# Patient Record
Sex: FEMALE | Race: WHITE
[De-identification: names, ages, dates, MRNs, and addresses within clinical notes are randomized per-mention and may not be internally consistent; named-entity substitution may affect disease eponyms.]

---

## 2017-05-19 NOTE — EDM.PDOC
ED HPI GENERAL MEDICAL PROBLEM





- General


Stated Complaint: LOW POTASSIUM


Time Seen by Provider: 05/19/17 16:09


Source of Information: Reports: Patient, Family


History Limitations: Reports: Physical Impairment





- History of Present Illness


INITIAL COMMENTS - FREE TEXT/NARRATIVE: 





71 y.o.w.f. with a h/o frequent falls, came to the ED after she was seen at the 

clinic for her yearly physical, when she was found her potassium was 2.3. Pt is 

a poor historian, her HPT was given by her daughter. Pt complains of weakness 

and being always tired. Pt take thyroid meds. Pt denies any other acute medical 

issue.


Onset: Gradual


Onset Date: 05/16/17


Onset Time: 17:00


Duration: Day(s):


Location: Reports: Generalized


Severity: Mild


Improves with: Reports: None


Worsens with: Reports: None


Associated Symptoms: Reports: No Other Symptoms





- Related Data


 Allergies











Allergy/AdvReac Type Severity Reaction Status Date / Time


 


amoxicillin Allergy  Rash Verified 05/19/17 16:35


 


latex Allergy  Cough Verified 05/19/17 16:36











Home Meds: 


 Home Meds





Aspirin 81 mg PO DAILY 05/19/17 [History]


Atenolol/Chlorthalidone [Atenolol-Chlorthalidone 100-25] 1 tab PO DAILY 05/19/ 17 [History]


Calcium Carb/Vit D3/Minerals [Calcium 600+D Plus Minerals] 1,200 mg PO DAILY 05/ 19/17 [History]


Cholecalciferol (Vitamin D3) [Vitamin D3] 1,000 unit PO DAILY 05/19/17 [History]


DULoxetine [Cymbalta] 20 mg PO DAILY 05/19/17 [History]


Dextran 70/Hypromellose/PF [Artificial Tears Drops] 1 each OP BID 05/19/17 [

History]


Docusate Calcium 240 mg PO DAILY 05/19/17 [History]


Furosemide [Lasix] 20 mg PO DAILY 05/19/17 [History]


Multivitamin [Multi-Vitamin Daily] 1 tab PO DAILY 05/19/17 [History]


Omega-3S/DHA/Epa/Fish Oil [Omega-3 Fish Oil 1,200 mg Sfgl] 1,200 mg PO DAILY 05/ 19/17 [History]


Tolterodine Tartrate [Detrol LA] 4 mg PO DAILY 05/19/17 [History]


cloNIDine [Catapres] 0.1 mg PO DAILY 05/19/17 [History]











ED ROS GENERAL





- Review of Systems


Review Of Systems: Unable To Obtain





ED EXAM, GENERAL





- Physical Exam


Exam: See Below


Exam Limited By: Physical Impairment


General Appearance: Alert, Mild Distress, Obese (morbid)


Eye Exam: Bilateral Eye: Normal Inspection


Ears: Normal External Exam


Ear Exam: Bilateral Ear: Auricle Normal


Nose: Normal Inspection, Normal Mucosa


Throat/Mouth: Normal Inspection, Normal Lips


Head: Atraumatic, Normocephalic


Neck: Tender Midline


Respiratory/Chest: No Respiratory Distress, Lungs Clear (poor insp effort)


Cardiovascular: Normal Peripheral Pulses, Regular Rate, Rhythm


Peripheral Pulses: 2+: Femoral (L), Femoral (R)


GI/Abdominal: Normal Bowel Sounds, Soft, Non-Tender


 (Female) Exam: Deferred


Rectal (Female) Exam: Deferred


Back Exam: Normal Inspection, Full Range of Motion


Extremities: Normal Inspection, Normal Range of Motion


Neurological: Alert, Oriented, CN II-XII Intact


Psychiatric: Depressed Mood


Skin Exam: Warm, Intact, Normal Color


Lymphatic: No Adenopathy





EKG INTERPRETATION


EKG Date: 05/19/17


Time: 18:10


Rhythm: NSR


Rate (beats/min): 61


Axis: normal


P-wave: present


QRS: normal


ST-T: normal


QT: normal


Comparison: NA - no prior EKG





Course





- Vital Signs


Text/Narrative:: 





71 y.o.w.f. with a h/o frequent falls, came to the ED after she was seen at the 

clinic for her yearly physical, when she was found her potassium was 2.3. Pt is 

a poor historian, her HPT was given by her daughter. Pt complains of weakness 

and being always tired. Pt take thyroid meds. Has neck painPt denies any other 

acute medical issue.


PE: Morbid obese 71 y.o.w.f in NAD, Neck pain to palpation at midline


labs: Potassium 2.3  Mg pending UA pos fro uti


Impression: Hypokalemia, Morbid obese, frequent falls, weakness, neck pain, UTI,


Tx: KCL 40meq po, 20 meq i v over 2 hours.  


Reexam: Improved


Plan: Admit to med/surge tele for obs


Consultation: Dr. Murphy: Admit to Dr. Lozano 


Last Recorded V/S: 


 Last Vital Signs











Temp  36.4 C   05/19/17 16:10


 


Pulse  64   05/19/17 16:10


 


Resp  18   05/19/17 16:10


 


BP  133/56 L  05/19/17 16:10


 


Pulse Ox  98   05/19/17 16:10














- Orders/Labs/Meds


Orders: 


 Active Orders 24 hr











 Category Date Time Status


 


 EKG Documentation Completion [RC] ASDIRECTED Care  05/19/17 16:31 Active


 


 CXR [Chest 2V] [CR] Stat Exams  05/19/17 17:06 Taken


 


 Cervical Spine 1V [CR] Stat Exams  05/19/17 16:44 Stop Req


 


 Cervical Spine wo Cont [CT] Stat Exams  05/19/17 16:47 Taken


 


 EKG 12 Lead [EK] Routine Ther  05/19/17 16:30 Ordered








 Medication Orders





Enoxaparin Sodium (Lovenox)  40 mg SUBCUT Q12HR SHANI


Sodium Chloride (Normal Saline)  250 mls @ 100 mls/hr IV ASDIRECTED SHANI


   Last Admin: 05/19/17 18:38  Dose: 100 mls/hr








Labs: 


 Laboratory Tests











  05/19/17 Range/Units





  17:09 


 


Urine Color  Yellow  (YELLOW)  


 


Urine Appearance  Clear  (CLEAR)  


 


Urine pH  7.0 H  (5.0-6.5)  


 


Ur Specific Gravity  1.010  (1.010-1.025)  


 


Urine Protein  Negative  (NEGATIVE)  mg/dL


 


Urine Glucose (UA)  Normal  (NEGATIVE)  mg/dL


 


Urine Ketones  Negative  (NEGATIVE)  mg/dL


 


Urine Occult Blood  Moderate H  (NEGATIVE)  


 


Urine Nitrite  Negative  (NEGATIVE)  


 


Urine Bilirubin  Negative  (NEGATIVE)  


 


Urine Urobilinogen  Normal  (NEGATIVE)  mg/dL


 


Ur Leukocyte Esterase  Moderate H  (NEGATIVE)  


 


Urine RBC  5-10  (0)  


 


Urine WBC  10-20 H  (0)  


 


Ur Squamous Epith Cells  Few H  (NS,R,O)  


 


Urine Bacteria  Few H  (NS)  











Meds: 


Medications











Generic Name Dose Route Start Last Admin





  Trade Name Freq  PRN Reason Stop Dose Admin


 


Enoxaparin Sodium  40 mg  05/19/17 17:30  





  Lovenox  SUBCUT   





  Q12HR SHANI   


 


Sodium Chloride  250 mls @ 100 mls/hr  05/19/17 18:30  05/19/17 18:38





  Normal Saline  IV   100 mls/hr





  ASDIRECTED SHANI   Administration














Discontinued Medications














Generic Name Dose Route Start Last Admin





  Trade Name Freq  PRN Reason Stop Dose Admin


 


Ciprofloxacin  500 mg  05/19/17 17:47  05/19/17 18:39





  Ciprofloxacin Hcl  PO  05/19/17 17:48  500 mg





  ONETIME ONE   Administration


 


Potassium Chloride 20 meq/  100 mls @ 50 mls/hr  05/19/17 16:28  05/19/17 18:16





  Premix  IV  05/19/17 18:27  50 mls/hr





  ONETIME ONE   Administration


 


Potassium Chloride  40 meq  05/19/17 16:22  05/19/17 16:32





  Klor-Con M20  PO  05/19/17 16:23  40 meq





  ONETIME ONE   Administration














Departure





- Departure


Time of Disposition: 18:35


Disposition: Refer to Observation


Condition: fair


Clinical Impression: 


 Hypokalemia








- My Orders


Last 24 Hours: 


My Active Orders





05/19/17 16:30


EKG 12 Lead [EK] Routine 





05/19/17 16:31


EKG Documentation Completion [RC] ASDIRECTED 





05/19/17 16:44


Cervical Spine 1V [CR] Stat 





05/19/17 16:47


Cervical Spine wo Cont [CT] Stat 





05/19/17 17:06


CXR [Chest 2V] [CR] Stat 














- Assessment/Plan


Last 24 Hours: 


My Active Orders





05/19/17 16:30


EKG 12 Lead [EK] Routine 





05/19/17 16:31


EKG Documentation Completion [RC] ASDIRECTED 





05/19/17 16:44


Cervical Spine 1V [CR] Stat 





05/19/17 16:47


Cervical Spine wo Cont [CT] Stat 





05/19/17 17:06


CXR [Chest 2V] [CR] Stat

## 2017-05-20 NOTE — HP
ADMISSION DATE:  2017

 

REASON FOR VISIT:  Recent fall, weakness, headache, profound hypokalemia.

 

HISTORY OF PRESENT ILLNESS:  Bryanna Abarca was seen in the morning of

2017.

 

Had been admitted on  through the ER.  Presented with a recent fall neck

injury, profound hypokalemia, generalized weakness.

 

She was found to have a potassium of 2.3 on the day of admission.  Admission to

hospital is indicated.

 

MEDICATIONS:  Present daily medications include:

1. ASA aspirin 81 mg 1 p.o. daily.

2. Betamethasone b.i.d. rash.

3. Calcium plus vitamin D2 daily.

4. Vitamin D3 1000 units one daily.

5. Furosemide 20 mg 1 p.o. daily.

6. Multivitamin one daily.

7. Nutrition omega-3 fish oil one daily.

8. Nutrition atenolol HCTZ 10 to 100-25 one p.o. daily blood pressure.

9. Citalopram 40 mg 1 p.o. daily mood stabilizer.

10.Cymbalta 30 mg 1 p.o. b.i.d. pain control.

11.Docusate sodium 240 one daily stools.

12.Levothyroxine 88 mcg one p.o. daily hypothyroidism.

13.Spironolactone 25 mg 1 p.o. daily edema.

14.Clonidine patch 0.1 mg weekly.

15.Trazodone 100 mg 1 p.o. at bedtime.

 

ALLERGIES:  Allergic to amoxicillin with rash, latex with cough.

 

PAST MEDICAL HISTORY:  Significant for bilateral total knee arthroplasty,

hysterectomy with bilateral salpingo-oophorectomy done through the abdominal

wall. She has an ongoing leg ulcer intervention per Dr. Izaguirre.  No other

operative procedures, hospitalizations, unusual childhood diseases, major

injuries, or fractures.

 

GYNECOLOGICAL HISTORY:  Postmenopausal,  1, para 1 female.

 

SOCIAL HISTORY:  .   .  He was a baker, she worked at [employer]

[employer].  One daughter whom she lives.  Nonsmoker.  No alcohol

consumption.  No illicit drug use.

 

REVIEW OF SYSTEMS:  History difficult.  Feeling weak and feeling tired,

intermittent neck pain.  Denies respiratory difficulty.  No bowel or bladder

impairment.  Some constipation, mild stress incontinence, left lower leg ulcer.

 

PHYSICAL EXAMINATION:  VITAL SIGNS:  36 degrees, pulse 70, blood pressure

129/50, mean blood pressure 76, respirations 18, 97% room air.  GENERAL:

Elderly, cooperative and conversant, significantly obese.  HEENT:  Funduscopic

benign.  Conjunctivae clear.  Bright tympanic membranes.  Decreased hearing.

Clear nasal discharge.  Mouth and oropharynx clear.  Poor dentition.  NECK:

Benign.  Thyroid small.  CHEST:  Clear in all lung fields.  No adventitious

sounds.  HEART:  Regular without ectopy or murmur.  ABDOMEN:  Soft, benign.  No

hepatosplenomegaly.  Well-healed lower abdominal surgical scar.  :  Deferred.

RECTAL:  Deferred.  EXTREMITIES:  Wrapped ulcer, left lower extremity below the

knee.  Peripheral pulses are diminished.  SKIN:  Eczematoid changes.

 

LABORATORY STUDIES:  This morning, , sodium 136, potassium 2.5, chloride 97.

GFR 55.  Urine suggesting UTI, on Cipro therapy.

 

ASSESSMENT:

1. Weakness due to hypokalemia.

2. Undiagnosed urinary tract infection.

3. Chronic pain syndrome.

4. Left lower extremity, leg ulcer.

5. Status post hysterectomy with bilateral oophorectomy.

6. Status post total knee arthroplasties.

7. Left leg ulcer.

 

PLAN:  Medications, care and treatment appropriate.  Replacement of potassium,

fluids, and hydration.  Proceed accordingly.  Urinary tract infection will be

reviewed.

 

CT cervical spine showed no bony injury.

 

Job#: [id number]

DD: 2017 0946

DT: 2017 1808 MULU/TULIO

## 2017-05-22 NOTE — PN
DATE SEEN:  05/21/2017

 

SUBJECTIVE:  Jenna Abarca is a 71-year-old  female, admitted with

profound weakness, jitteriness, increasing lethargy, and quite significant

hypokalemia, 2.5 on admission, 2.9, and 2.7 today.

 

In today for review.

 

She has had intravenous potassium.  Spoke to opportunities and implications.

 

Home situation may be under some conflict.

 

Spoke to opportunities in care, and home living situation along with support

services.   will be consulted.

 

MEDICATIONS:  Present medications include oral ciprofloxacin for UTI, Lovenox

for DVT prevention, and intravenous potassium.

 

OBJECTIVE:  VITAL SIGNS:  Temperature 36.6, pulse 67, blood pressure 105/70, 18

respirations, O2 saturations 98%.  NECK:  Benign, thyroid small.  CHEST:  Clear

in all lung fields.  No adventitious sounds.  HEART:  Regular without ectopy or

murmur.  ABDOMEN:  Obese.  EXTREMITIES:  Edema lower extremity.  Ulcerative

lesion covered left lower extremity.

 

ASSESSMENT:  Hypokalemia with weakness, marked obesity, progress decline in

physical well being.

 

PLAN:  PT, OT,  involved complementary care and well being.

Proceed accordingly.

 

Job#: [id number]

DD: 05/21/2017 1013

DT: 05/21/2017 1208 MULU/TULIO

## 2017-05-22 NOTE — CR
INDICATION:  Weakness. 



CHEST:  PA and lateral views of the chest 05/19/2017 were compared with 03/01/
2017 and 07/25/2006, revealing evidence of exogenous obesity as previously.  



The heart may be slightly increased in size, but remains within normal limits 
in size.  The aorta is tortuous.  Findings suggest ASHD of mild degree.  There 
is suggestion of a mass behind the heart which could be on the basis of a fixed 
hiatal hernia of moderate size.  



A definite active infiltrate or effusion was not identified.  



Slight flattening of diaphragm leaves and prominent AP diameter raise question 
of a mild degree of COPD - correlate clinically. 



Bridging hyperostotic changes are noted in the mid to lower thoracic spine with 
evidence of disk disease at several mid thoracic levels.  



IMPRESSION:  

1.  No acute process. 

2.  Probable mild ASHD.

3.  Probable mild COPD. 

4.  DJD spine with a mild dextroconvex scoliosis.

5.  Suggestion of a mass behind the heart which could be on the basis of a 
moderate sized fixed hiatal hernia.  This could be confirmed by esophagram or 
CT of the abdomen/chest as necessary clinically.  
MTDD

## 2017-05-22 NOTE — PN
DATE SEEN:  05/22/2017

 

SUBJECTIVE:  Jenna Abarca is a 71-year-old  female, admitted with

complicated weakness, profound hypokalemia, and weakness.  Clinical response has

been a bit subdued.  Potassium went from 2.5 to 2.9 to 2.7 to 3.0 this morning.

 

This in spite of multiple doses of IV potassium.

 

Care situation is in question.  She lives with her daughter and daughter's too

semi dependent children.  Living situation in under consideration.

 

PT OT involved.

 

Laboratory studies, otherwise have been unremarkable.

 

EXAM:  VITAL SIGNS:  36.4; pulse of 62; 102/56 is the blood pressure, mean blood

pressure 71; respirations 18; 92% on room air.  GENERAL:  Sitting in the upright

position.  Speech was fluent.  NECK:  Benign.  Thyroid small.  CHEST:  Clear in

all lung fields.  Decreased breath sounds both bases.  HEART:  Sounds were

distant.  ABDOMEN:  Markedly obese

 

dressing to the left lower leg under supervision by Dr. Izaguirre.  Moderate edema.

 

ASSESSMENT:

1. Hypokalemia with weakness.

2. Living situation in question, independence in question.

 

PLAN:  Upcoming review with , PT OT, and Intervention.

We will switch from IV to oral potassium, discharge plan accordingly.

 

Job#: [id number]

DD: 05/22/2017 0844

DT: 05/22/2017 1345 MULU/TULIO

## 2017-05-23 NOTE — PN
DATE SEEN:  05/22/2017

 

SUBJECTIVE:  This patient is being seen for a dressing change.  She was

scheduled to see me today.  She is being followed for venous stasis disease in

the medial aspect of her left ankle.  She had developed a small punctate

ulceration and was scheduled to see me today in clinic; however, due to her

admission for hypokalemia I was asked to see the patient here in the hospital.

 

OBJECTIVE:  The Unna boot which had been applied last week was removed.  The

lesion itself demonstrates a small breakdown of the skin approximately 1 cm in

diameter consisting of several punctate lesions.  She also has some venous

stasis changes to her lower extremity.  Aquacel Ag was reapplied as was the Unna

Boot, Kerlix, and Ace wrap.  I have instructed the patient to follow up next

week after discharge for dressing change.

 

Job#: [id number]

DD: 05/22/2017 1612

DT: 05/22/2017 2121 AS/CASSIDYL

## 2017-05-23 NOTE — PN
DATE SEEN:  05/23/2017

 

SUBJECTIVE:  Jenna Abarca is a 71-year-old  female, admitted with

profound weakness and hypokalemia.

 

Response has been satisfactory.

 

Last two potassium is 3.6 and 3.4.

 

Ambulatory skills are limited, getting up from the bed, getting up from the

chair quite problematic.

 

Ambulatory skills are limited.

 

Focusing on improvement is under review.

 

Swing bed status upcoming and planned.

 

OBJECTIVE:  VITAL SIGNS:  36.1, 121/57, 70 is the mean blood pressure, 20 is the

respiration, 97% on room air.  GENERAL:  Appears comfortable.  Speech was

fluent.  NECK:  Benign.  Thyroid small.  CHEST:  Clear in all lung fields.

HEART:  Regular.  No ectopy or murmur.  ABDOMEN:  Benign.  Significantly obese.

EXTREMITIES:  Moderate edema lower extremities, ulcer left ankle, under review

and observation.

 

ASSESSMENT:  Complicated fatigue, profound weakness, hypokalemia.

 

PLAN:  Appears to be well, we will continue oral.  Potassium, complementary

care, and well being, medications as appropriate.

 

Swing bed planned, upcoming tomorrow.

 

Job#: [id number]

DD: 05/23/2017 1002

DT: 05/23/2017 1052 /TULIO

## 2017-05-25 NOTE — PN
DATE SEEN:  05/25/2017

 

Jenna Abarca is a 71-year-old  female, admitted with weakness,

hyponatremia, and deconditioning.

 

Hospital course was satisfactory with improvement of her hypokalemia.  It is all

under supervision.

 

Now in swing bed.

 

Ambulatory skills particularly going from sitting out of the chair and from bed

to ambulance have been a primary issue, PT and OT actively involved.

 

We will recheck a potassium today, complementary care and well being.  She is on

adequate potassium chloride 20 mEq t.i.d., she has not been symptomatic,

electrocardiographic issues have been stable.  Comfortable in that regard.

 

Job#: [id number]

DD: 05/25/2017 0843

DT: 05/25/2017 0929 MULU/TULIO

## 2017-05-25 NOTE — DISCH
DISCHARGE DATE:  05/24/2017

 

HOSPITAL COURSE:  Jenna Abarca is a 71-year-old   female

admitted with complicated fatigue, profound hypokalemia, and profound lethargy.

 

Metabolically, things improved.  Potassium levels returned to normal.  There was

an increasing issue of self-care issues, ability to sit, transfer, and move.

 

PT, OT were actively involved.

 

At the time of discharge, need for ongoing care, i.e., swing bed timely and

appropriate felt to be indicated.

 

DISCHARGE MEDICATIONS:  Please see med recon list.

 

SURGICAL PROCEDURES:  None.

 

CONSULTATIONS:  None.

 

Job#: [id number]

DD: 05/24/2017 0953

DT: 05/25/2017 0214 MULU/TULIO

## 2017-05-28 NOTE — PCM.PN
- General Info


Date of Service: 05/28/17


Admission Dx/Problem (Free Text): 


Patient states she's doing well.  She states she still feels weak but is 

getting stronger.  She denies chest pain, shortness of breath, or leg swelling.





- Patient Data


Vitals - most recent: 


 Last Vital Signs











Temp  97.7 F   05/28/17 08:00


 


Pulse  68   05/28/17 08:38


 


Resp  16   05/28/17 08:00


 


BP  120/71   05/28/17 08:38


 


Pulse Ox  96   05/28/17 08:00











Weight - most recent: 308 lb 11.2 oz


Med Orders - Current: 


 Current Medications





Acetaminophen (Tylenol Extra Strength)  1,000 mg PO Q6H PRN


   PRN Reason: Pain


   Last Admin: 05/27/17 23:55 Dose:  1,000 mg


Artificial Tears (Refresh Tears 0.5%)  0 ml EYEBOTH ASDIRECTED [facility]


   Last Admin: 05/27/17 13:25 Dose:  2 drop


Aspirin (Aspirin)  81 mg PO DAILY [facility]


   Last Admin: 05/28/17 08:34 Dose:  81 mg


Atenolol (Tenormin)  100 mg PO DAILY [facility]


   Last Admin: 05/28/17 08:38 Dose:  100 mg


Betamethasone Dipropionate (Diprosone 0.05% Crm)  0 gm TOP BID [facility]


   Last Admin: 05/28/17 08:36 Dose:  1 applic


Chlorthalidone (Chlorthalidone)  25 mg PO DAILY [facility]


   Last Admin: 05/28/17 08:35 Dose:  25 mg


Citalopram Hydrobromide (Celexa)  20 mg PO DAILY [facility]


   Last Admin: 05/28/17 08:34 Dose:  20 mg


Clonidine HCl (Catapres-Tts 1)  0.1 mg TOP Tu@0900 [facility]


Docusate Sodium (Colace)  100 mg PO DAILY PRN


   PRN Reason: CONSTIPATION


Duloxetine HCl (Cymbalta)  30 mg PO BID [facility]


   Last Admin: 05/28/17 08:36 Dose:  30 mg


Enoxaparin Sodium (Lovenox)  40 mg SUBCUT Q24H [facility]


   Last Admin: 05/27/17 20:09 Dose:  40 mg


Levothyroxine Sodium (Synthroid)  88 mcg PO DAILY@0600 [facility]


   Last Admin: 05/28/17 05:15 Dose:  88 mcg


Magnesium Hydroxide (Milk Of Magnesia)  30 ml PO DAILY PRN


   PRN Reason: Constipation


Potassium Chloride (Klor-Con M20)  20 meq PO TID [facility]


   Last Admin: 05/28/17 08:37 Dose:  20 meq


Trazodone HCl (Trazodone)  100 mg PO BEDTIME [facility]


   Last Admin: 05/27/17 20:09 Dose:  100 mg


Zolpidem Tartrate (Ambien)  5 mg PO BEDTIME PRN


   PRN Reason: Insomnia


   Last Admin: 05/27/17 20:10 Dose:  5 mg





Discontinued Medications





Atenolol/Chlorthalidone (Tenoretic 100)  1 tab PO DAILY [facility]


Ciprofloxacin (Ciprofloxacin Hcl)  250 mg PO BID [facility]


   Stop: 05/26/17 21:00


   Last Admin: 05/26/17 20:21 Dose:  250 mg











- Exam


General: alert, oriented, cooperative


Lungs: Clear to auscultation, Normal respiratory effort


Cardiovascular: Regular Rate, Regular Rhythm, No Murmurs


Extremities: no edema





- Problem List & Annotations


(1) Weakness


SNOMED Code(s): 13975252


   Code(s): R53.1 - WEAKNESS   Status: Acute   Current Visit: Yes   





(2) Hypokalemia


SNOMED Code(s): 15621229


   Code(s): E87.6 - HYPOKALEMIA   Status: Acute   Current Visit: No   





- Problem List Review


Problem List Initiated/Reviewed/Updated: Yes





- Plan


Plan:: 


1.  Continue current care.

## 2017-06-03 NOTE — DISCH
DISCHARGE DATE:  06/02/2017

 

REASON FOR ADMISSION:

1. Hypokalemia.

2. Generalized weakness.

3. Physical deconditioning.

 

DISCHARGE DIAGNOSES:  Hypokalemia, generalized weakness, depression,

hypertension.

 

BRIEF HISTORY AND HOSPITAL COURSE:  This is a 71-year-old female, who was

admitted initially on the 24th to the hospital acute care side for frequent

falls, low potassium; was admitted for IV fluids, potassium replacement, and PT.

She improved and was discharged to swing bed on the 26th, has been there

attending physical therapy, strengthening, and she is ready to go home.  Her

potassium has been replaced and last potassium checked was 3.5.

 

DISCHARGE MEDICATIONS:  She was discharged on the following medications.

1. Acetaminophen 1000 mg p.r.n. every 6 hours.

2. Aspirin 81 mg a day.

3. Atenolol 100 mg a day.

4. Betamethasone cream b.i.d. topically.

5. Carboxymethylcellulose to both eyes.

6. Chlorthalidone 25 mg a day.

7. Celexa 10 mg a day.

8. Cymbalta 30 mg b.i.d.

9. Docusate 100 mg daily p.r.n.

10.Levothyroxine 88 mcg daily.

11.Magnesium hydroxide 30 mL p.o. daily p.r.n.

12.Potassium chloride 20 mEq t.i.d.

13.Ambien 5 mg at night p.r.n.

14.Clonidine 0.1 mg at nine every morning.

15.Trazodone 100 mg at bedtime.

 

FOLLOWUP:  She will see PCP in a week.  She will also go home with home health

because she still needs supervision with medications and to continue physical

therapy as necessary.

 

Please note that I spent 45 minutes in the discharge of this patient.

 

Job#: [id number]

DD: 06/02/2017 0816

DT: 06/03/2017 0205 TN/TULIO

## 2017-07-13 NOTE — EDM.PDOC
ED HPI GENERAL MEDICAL PROBLEM





- General


Chief Complaint: General


Stated Complaint: WEAKNESS


Time Seen by Provider: 17 16:40


Source of Information: Reports: Patient, EMS, Old Records


History Limitations: Reports: No Limitations





- History of Present Illness


INITIAL COMMENTS - FREE TEXT/NARRATIVE: 





72 yo female is brought in for weakness. Was recently hospitalized for 

hypokalemia. Has chronic weakness that is getting worse. Has used a walker to 

get around for about 2 yrs now. Lives with her daughter. Before yesterday was 

able to walk only a short distance within the home, now cannot even do that. No 

hx of CVA. No recent illness other than the hypokalemia and some constipation. 


Onset: Gradual


Duration: Chronic, Getting Worse


Location: Reports: Generalized (Worse in LE's)


Quality: Reports: Other (no pain)


Severity: Severe


Improves with: Reports: None


Worsens with: Reports: Other (? time)


Context: Reports: Other (Progressive over time.)


Associated Symptoms: Reports: Weakness.  Denies: Confusion, Chest Pain, Cough, 

Diaphoresis, Fever/Chills, Loss of Appetite, Malaise, Nausea/Vomiting, Rash, 

Seizure, Shortness of Breath


Treatments PTA: Reports: Other (see below) (none)





- Related Data


 Allergies











Allergy/AdvReac Type Severity Reaction Status Date / Time


 


amoxicillin Allergy  Rash Verified 17 13:23


 


latex Allergy  Cough Verified 17 13:23











Home Meds: 


 Home Meds





Aspirin 81 mg PO DAILY 17 [History]


Atenolol/Chlorthalidone [Atenolol-Chlorthalidone 100-25] 1 tab PO DAILY  [History]


Calcium Carb/Vit D3/Minerals [Calcium 600+D Plus Minerals] 1,200 mg PO DAILY  [History]


Cholecalciferol (Vitamin D3) [Vitamin D3] 1,000 unit PO DAILY 17 [History]


Dextran 70/Hypromellose/PF [Artificial Tears Drops] 1 each EYEBOTH BID PRN  [History]


Levothyroxine [Synthroid] 88 mcg PO ACBREAKFAST 17 [History]


Multivitamin [Multi-Vitamin Daily] 1 tab PO DAILY 17 [History]


Omega-3S/DHA/Epa/Fish Oil [Omega-3 Fish Oil 1,200 mg Sfgl] 1,200 mg PO DAILY  [History]


traZODone HCl [Trazodone HCl] 100 mg PO BEDTIME 17 [History]


Agilease 1 tab PO BID 17 [History]


Sulfurzyme 2 tab PO BID 17 [History]


Acetaminophen [Tylenol Extra Strength] 1,000 mg PO Q6H PRN #0 tablet 17 [

Rx]


Docusate Sodium [Colace] 100 mg PO DAILY PRN 17 [History]


Betamethasone Dipropionate [Diprosone 0.05% Crm] 1 applicful TOP BID PRN  [History]


Carboxymethylcellulose Sodium [Refresh Tears 0.5%] 1 drop EYEBOTH ASDIRECTED 

PRN 17 [History]


Potassium Chloride [Klor-Con M20] 20 meq PO BID 17 [History]











Past Medical History





- Past Health History


Medical/Surgical History: Denies Medical/Surgical History


Other HEENT History: Pt wears glasses.


Cardiovascular History: Reports: Hypertension


Gastrointestinal History: Reports: GERD


Genitourinary History: Reports: Urinary Incontinence, Other (See Below)


Other Genitourinary History: overactive bladder


OB/GYN History: Reports: Pregnancy, Other (See Below)


Other OB/BYN History: 


Musculoskeletal History: Reports: Back Pain, Chronic, Osteoarthritis


Psychiatric History: Reports: Depression


Endocrine/Metabolic History: Reports: Hypothyroidism, Obesity/BMI 30+


Dermatologic History: Reports: Eczema





- Past Surgical History


Female  Surgical History: Reports: Hysterectomy





Social & Family History





- Tobacco Use


Smoking Status *Q: Never Smoker


Second Hand Smoke Exposure: No





- Caffeine Use


Caffeine Use: Reports: None





- Recreational Drug Use


Recreational Drug Use: No





ED ROS GENERAL





- Review of Systems


Review Of Systems: See Below


Constitutional: Reports: Weakness.  Denies: Fever, Chills, Malaise, Decreased 

Appetite, Weight Loss, Weight Gain


HEENT: Reports: No Symptoms


Respiratory: Reports: No Symptoms


Cardiovascular: Reports: No Symptoms


Endocrine: Reports: No Symptoms


GI/Abdominal: Reports: Constipation.  Denies: Abdominal Pain, Anorexia, Black 

Stool, Bloody Stool, Diarrhea, Distension, Flatus, Hematemesis, Hematochezia, 

Melena, Nausea, Stool Incontinence, Vomiting


: Reports: No Symptoms


Musculoskeletal: Reports: No Symptoms


Skin: Reports: No Symptoms


Neurological: Reports: Weakness (generalized)


Psychiatric: Reports: No Symptoms





ED EXAM, NEURO





- Physical Exam


Exam: See Below


Exam Limited By: No Limitations


General Appearance: Alert, WD/WN, No Apparent Distress, Obese


Eye Exam: Bilateral Eye: Conjunctival Injection, Normal Inspection, PERRL


Ears: Normal External Exam, Normal Canal, Hearing Grossly Normal


Nose: Normal Inspection, Normal Mucosa, No Blood


Throat/Mouth: Normal Inspection, Normal Lips, Normal Teeth, Normal Oropharynx, 

Normal Voice, No Airway Compromise


Head Exam: Atraumatic, Normocephalic


Neck: Normal Inspection, Supple, Non-Tender


Respiratory/Chest: No Respiratory Distress, Lungs Clear, Normal Breath Sounds, 

No Accessory Muscle Use


Cardiovascular: Regular Rate, Rhythm, No Edema


GI/Abdominal: Normal Bowel Sounds, Soft, Non-Tender, No Distention


Neurological: Alert, Normal Mood/Affect, CN II-XII Intact, No Motor/Sensory 

Deficits, Oriented x 3, Other (Diffuse LE weakness, bilateral)


Back Exam: Normal Inspection.  No: CVA Tenderness (R), CVA Tenderness (L)


Extremities: Normal Inspection, Normal Range of Motion, Pedal Edema.  No: No 

Pedal Edema, Increased Warmth, Redness


Psychiatric: Normal Affect, Normal Mood


Skin Exam: Warm, Dry, Intact, Normal Color, No Rash





Course





- Vital Signs


Text/Narrative:: 





LR 1000 ml IV


Last Recorded V/S: 


 Last Vital Signs











Temp  36.4 C   17 16:30


 


Pulse  67   17 16:30


 


Resp  18   17 16:30


 


BP  141/66 H  17 16:30


 


Pulse Ox  100   17 16:30














- Orders/Labs/Meds


Orders: 


 Active Orders 24 hr











 Category Date Time Status


 


 MAGNESIUM [CHEM] Stat Lab  17 17:39 Ordered


 


 Lactated Ringers [Ringers, Lactated] 1,000 ml Med  17 17:32 Active





 IV BOLUS   








 Medication Orders





Lactated Ringer's (Ringers, Lactated)  1,000 mls @ 1,000 mls/hr IV BOLUS ONE


   Stop: 17 18:31








Labs: 


 Laboratory Tests











  17 Range/Units





  16:55 16:55 16:55 


 


WBC   6.8   (4.5-12.0)  X10-3/uL


 


RBC   3.82   (3.23-5.20)  x10(6)uL


 


Hgb   12.1   (11.5-15.5)  g/dL


 


Hct   36.2   (30.0-51.3)  %


 


MCV   94.8   (80-96)  fL


 


MCH   31.8   (27.7-33.6)  pg


 


MCHC   33.5   (32.2-35.4)  g/dL


 


RDW   12.9   (11.5-15.5)  %


 


Plt Count   182   (125-369)  X10(3)uL


 


Sodium  140    (135-145)  mmol/L


 


Potassium  3.9    (3.5-5.3)  mmol/L


 


Chloride  107    (100-110)  mmol/L


 


Carbon Dioxide  26    (23-29)  mmol/L


 


BUN  25 H D    (8-23)  mg/dL


 


Creatinine  0.9    (0.6-1.3)  mg/dL


 


Est Cr Clr Drug Dosing  TNP    


 


Estimated GFR (MDRD)  > 60    (>60)  


 


BUN/Creatinine Ratio  27.8 H    (9-20)  


 


Glucose  107    ()  mg/dL


 


Calcium  9.6    (8.6-10.2)  mg/dL


 


Troponin I    < 0.01 L  (0.02-0.06)  NG/ML


 


Urine Color     (YELLOW)  


 


Urine Appearance     (CLEAR)  


 


Urine pH     (5.0-6.5)  


 


Ur Specific Gravity     (1.010-1.025)  


 


Urine Protein     (NEGATIVE)  mg/dL


 


Urine Glucose (UA)     (NEGATIVE)  mg/dL


 


Urine Ketones     (NEGATIVE)  mg/dL


 


Urine Occult Blood     (NEGATIVE)  


 


Urine Nitrite     (NEGATIVE)  


 


Urine Bilirubin     (NEGATIVE)  


 


Urine Urobilinogen     (NEGATIVE)  mg/dL


 


Ur Leukocyte Esterase     (NEGATIVE)  


 


Urine RBC     (0)  


 


Urine WBC     (0)  


 


Ur Squamous Epith Cells     (NS,R,O)  


 


Urine Bacteria     (NS)  














  17 Range/Units





  17:10 


 


WBC   (4.5-12.0)  X10-3/uL


 


RBC   (3.23-5.20)  x10(6)uL


 


Hgb   (11.5-15.5)  g/dL


 


Hct   (30.0-51.3)  %


 


MCV   (80-96)  fL


 


MCH   (27.7-33.6)  pg


 


MCHC   (32.2-35.4)  g/dL


 


RDW   (11.5-15.5)  %


 


Plt Count   (125-369)  X10(3)uL


 


Sodium   (135-145)  mmol/L


 


Potassium   (3.5-5.3)  mmol/L


 


Chloride   (100-110)  mmol/L


 


Carbon Dioxide   (23-29)  mmol/L


 


BUN   (8-23)  mg/dL


 


Creatinine   (0.6-1.3)  mg/dL


 


Est Cr Clr Drug Dosing   


 


Estimated GFR (MDRD)   (>60)  


 


BUN/Creatinine Ratio   (9-20)  


 


Glucose   ()  mg/dL


 


Calcium   (8.6-10.2)  mg/dL


 


Troponin I   (0.02-0.06)  NG/ML


 


Urine Color  Yellow  (YELLOW)  


 


Urine Appearance  Clear  (CLEAR)  


 


Urine pH  6.0  (5.0-6.5)  


 


Ur Specific Gravity  1.015  (1.010-1.025)  


 


Urine Protein  Negative  (NEGATIVE)  mg/dL


 


Urine Glucose (UA)  Normal  (NEGATIVE)  mg/dL


 


Urine Ketones  Negative  (NEGATIVE)  mg/dL


 


Urine Occult Blood  Negative  (NEGATIVE)  


 


Urine Nitrite  Negative  (NEGATIVE)  


 


Urine Bilirubin  Negative  (NEGATIVE)  


 


Urine Urobilinogen  Normal  (NEGATIVE)  mg/dL


 


Ur Leukocyte Esterase  Negative  (NEGATIVE)  


 


Urine RBC  0-5  (0)  


 


Urine WBC  0-5  (0)  


 


Ur Squamous Epith Cells  Moderate H  (NS,R,O)  


 


Urine Bacteria  Moderate H  (NS)  











Meds: 


Medications











Generic Name Dose Route Start Last Admin





  Trade Name Freq  PRN Reason Stop Dose Admin


 


Lactated Ringer's  1,000 mls @ 1,000 mls/hr  17 17:32  





  Ringers, Lactated  IV  17 18:31  





  BOLUS ONE   














Discontinued Medications














Generic Name Dose Route Start Last Admin





  Trade Name Freq  PRN Reason Stop Dose Admin


 


Magnesium Citrate  296 ml  17 16:51  





  Citrate Of Magnesia  PO  17 16:52  





  ONETIME ONE   














Departure





- Departure


Time of Disposition: 18:00


Disposition: Refer to Observation


Condition: Fair


Clinical Impression: 


 Bilateral leg weakness





Obesity


Qualifiers:


 Obesity type: due to excess calories Obesity classification: unspecified 

obesity classification Serious obesity comorbidity presence: without serious 

comorbidity Qualified Code(s): E66.09 - Other obesity due to excess calories








- Discharge Information





- My Orders


Last 24 Hours: 


My Active Orders





17 17:32


Lactated Ringers [Ringers, Lactated] 1,000 ml IV BOLUS 





17 17:39


MAGNESIUM [CHEM] Stat 














- Assessment/Plan


Last 24 Hours: 


My Active Orders





17 17:32


Lactated Ringers [Ringers, Lactated] 1,000 ml IV BOLUS 





17 17:39


MAGNESIUM [CHEM] Stat

## 2017-07-14 NOTE — PCM.HP
H&P History of Present Illness





- General


Date of Service: 17


Admit Problem/Dx: 


 Admission Diagnosis/Problem





Admission Diagnosis/Problem      Weakness








Source of Information: Patient, Old Records, RN Notes Reviewed





- History of Present Illness


Initial Comments - Free Text/Narative: 





72 yo female is brought in for weakness. Was recently hospitalized for 

hypokalemia. Has chronic weakness that is getting worse. Has used a walker to 

get around for about 2 yrs now. Lives with her daughter. Before yesterday was 

able to walk only a short distance within the home, now cannot even do that. No 

hx of CVA. No recent illness other than the hypokalemia and some constipation. 

She has history of hypertension, obesity and chronic back pain that previously 

were stable.. She's been admitted before for frequent falls and generalized 

weakness. [employer] been unable to support her in the last 2-3 days. This morning 

she complains of back pain but with no radiation she's able to move the legs 

but cannot bear weight. She denies fever or chills that she have any nausea 

vomiting or chest pain. No shortness of breath. 





- Related Data


Allergies/Adverse Reactions: 


 Allergies











Allergy/AdvReac Type Severity Reaction Status Date / Time


 


amoxicillin Allergy  Rash Verified 17 13:23


 


latex Allergy  Cough Verified 17 13:23











Home Medications: 


 Home Meds





Aspirin 81 mg PO DAILY 17 [History]


Atenolol/Chlorthalidone [Atenolol-Chlorthalidone 100-25] 1 tab PO DAILY  [History]


Calcium Carb/Vit D3/Minerals [Calcium 600+D Plus Minerals] 1,200 mg PO DAILY  [History]


Cholecalciferol (Vitamin D3) [Vitamin D3] 1,000 unit PO DAILY 17 [History]


Dextran 70/Hypromellose/PF [Artificial Tears Drops] 1 each EYEBOTH BID PRN  [History]


Levothyroxine [Synthroid] 88 mcg PO ACBREAKFAST 17 [History]


Multivitamin [Multi-Vitamin Daily] 1 tab PO DAILY 17 [History]


Omega-3S/DHA/Epa/Fish Oil [Omega-3 Fish Oil 1,200 mg Sfgl] 1,200 mg PO DAILY  [History]


traZODone HCl [Trazodone HCl] 100 mg PO BEDTIME 17 [History]


Agilease 1 tab PO BID 17 [History]


Sulfurzyme 2 tab PO BID 17 [History]


Acetaminophen [Tylenol Extra Strength] 1,000 mg PO Q6H PRN #0 tablet 17 [

Rx]


Docusate Sodium [Colace] 100 mg PO DAILY PRN 17 [History]


Betamethasone Dipropionate [Diprosone 0.05% Crm] 1 applicful TOP BID PRN  [History]


Carboxymethylcellulose Sodium [Refresh Tears 0.5%] 1 drop EYEBOTH ASDIRECTED 

PRN 17 [History]


Potassium Chloride [Klor-Con M20] 20 meq PO BID 17 [History]











Past Medical History





- Past Health History


Medical/Surgical History: Denies Medical/Surgical History


HEENT History: Reports: Cataract


Other HEENT History: Pt wears glasses.


Cardiovascular History: Reports: Blood Clots/VTE/DVT, Hypertension


Gastrointestinal History: Reports: GERD


Genitourinary History: Reports: Urinary Incontinence, Other (See Below)


Other Genitourinary History: overactive bladder


OB/GYN History: Reports: Pregnancy, Other (See Below)


Other OB/BYN History: 


Musculoskeletal History: Reports: Back Pain, Chronic, Osteoarthritis


Psychiatric History: Reports: Depression


Endocrine/Metabolic History: Reports: Hypothyroidism, Obesity/BMI 30+


Dermatologic History: Reports: Eczema


Other Dermatologic History: venous ulcer to L medial malleolus & has been 

treating for past 1yr.





- Infectious Disease History


Infectious Disease History: Reports: Chicken Pox, Measles, Mumps, Rubella





- Past Surgical History


HEENT Surgical History: Reports: None


Cardiovascular Surgical History: Reports: None


GI Surgical History: Reports: None


Female  Surgical History: Reports: Hysterectomy


Endocrine Surgical History: Reports: None


Musculoskeletal Surgical History: Reports: Knee Replacement


Dermatological Surgical History: Reports: None





Social & Family History





- Family History


Family Medical History: Noncontributory





- Tobacco Use


Smoking Status *Q: Never Smoker


Second Hand Smoke Exposure: No





- Caffeine Use


Caffeine Use: Reports: None





- Recreational Drug Use


Recreational Drug Use: No





H&P Review of Systems





- Review of Systems:


Review Of Systems: ROS reveals no pertinent complaints other than HPI.





Exam





- Exam


Exam: See Below





- Vital Signs


Vital Signs: 


 Last Vital Signs











Temp  98.4 F   17 05:00


 


Pulse  73   17 05:00


 


Resp  18   17 05:00


 


BP  95/51 L  17 05:00


 


Pulse Ox  94 L  17 05:00











Weight: 143.335 kg





- Exam


General: Alert, Oriented, 4


HEENT: PERRLA, Hearing Intact, Mucosa Moist & Pink, Nares Patent, Normal Nasal 

Septum, Posterior Pharynx Clear, Conjunctiva Clear, EOMI, EACs Clear, TMs Clear


Neck: Supple, Trachea Midline, 2


Lungs: Clear to Auscultation, Normal Respiratory Effort


Cardiovascular: Regular Rate, Regular Rhythm


Abdomen: Normal Bowel Sounds, Distention.  No: Tenderness, Splenomegaly


 (Female) Exam: Deferred


Rectal (Female) Exam: Deferred


Back Exam: Normal Inspection, Muscle Spasm, Paraspinal Tenderness, Vertebral 

Tenderness


Extremities: Increased Warmth


Skin: Warm


Neurological: Cranial Nerves Intact, Reflexes Equal Bilateral


Neuro Extensive - Mental Status: Alert, Oriented x3, Normal Mood/Affect, Normal 

Cognition


Psychiatric: Alert, Depressed





- Patient Data


Result Diagrams: 


 17 07:58





 17 07:58





*Q Meaningful Use (ADM)





- VTE *Q


VTE Criteria *Q: 








- Stroke *Q


Stroke Criteria *Q: 








- AMI *Q


AMI Criteria *Q: 








- Problem List


(1) Weakness


SNOMED Code(s): 91145306


   ICD Code: R53.1 - WEAKNESS   Status: Acute   Current Visit: No   





(2) HTN (hypertension)


SNOMED Code(s): 63081942


   ICD Code: I10 - ESSENTIAL (PRIMARY) HYPERTENSION   Status: Chronic   Current 

Visit: Yes   


Qualifiers: 


   Hypertension type: essential hypertension   Qualified Code(s): I10 - 

Essential (primary) hypertension   





(3) Back pain


SNOMED Code(s): 459204199


   ICD Code: M54.9 - DORSALGIA, UNSPECIFIED   Status: Chronic   Current Visit: 

Yes   


Qualifiers: 


   Back pain location: low back pain 





(4) Venous stasis dermatitis


Status: Acute   Current Visit: Yes   





(5) Depression


SNOMED Code(s): 73359513


   ICD Code: F32.9 - MAJOR DEPRESSIVE DISORDER, SINGLE EPISODE, UNSPECIFIED   

Status: Chronic   Current Visit: Yes   


Qualifiers: 


   Depression Type: major depressive disorder   Major depression recurrence: 

recurrent   Active/Remission status: currently active 





(6) Hypothyroid


SNOMED Code(s): 53915523


   ICD Code: E03.9 - HYPOTHYROIDISM, UNSPECIFIED   Status: Chronic   Current 

Visit: Yes   


Qualifiers: 


   Hypothyroidism type: acquired   Qualified Code(s): E03.9 - Hypothyroidism, 

unspecified   





(7) Frequent falls


SNOMED Code(s): 646250933


   ICD Code: R29.6 - REPEATED FALLS   Status: Acute   Current Visit: Yes   





(8) Bilateral leg weakness


SNOMED Code(s): 0500859


   ICD Code: R29.898 - OTH SYMPTOMS AND SIGNS INVOLVING THE MUSCULOSKELETAL 

SYSTEM   Status: Acute   Current Visit: Yes   





(9) Obesity


SNOMED Code(s): 802616459


   ICD Code: E66.9 - OBESITY, UNSPECIFIED   Status: Chronic   Current Visit: 

Yes   


Qualifiers: 


   Obesity type: due to excess calories   Obesity classification: unspecified 

obesity classification   Serious obesity comorbidity presence: without serious 

comorbidity   Qualified Code(s): E66.09 - Other obesity due to excess calories 

  


Problem List Initiated/Reviewed/Updated: Yes


Orders Last 24hrs: 


 Active Orders 24 hr











 Category Date Time Status


 


 OT Evaluation and Treatment [CONS] Routine Cons  17 07:37 Ordered


 


 PT Evaluation and Treatment [CONS] Routine Cons  17 07:37 Ordered


 


 BASIC METABOLIC PANEL,BMP [CHEM] Routine Lab  17 07:37 Ordered


 


 CBC WITH AUTO DIFF [HEME] Routine Lab  17 07:37 Ordered


 


 Acetaminophen [Tylenol Extra Strength] Med  17 17:53 Active





 1,000 mg PO Q6H PRN   


 


 Agilease Med  17 21:00 Active





 1 tab PO BID   


 


 Aspirin Med  17 09:00 Active





 81 mg PO DAILY   


 


 Atenolol/Chlorthalidone [Tenoretic 100] Med  17 09:00 Active





 1 tab PO DAILY   


 


 Docusate Sodium [Colace] Med  17 17:53 Active





 100 mg PO DAILY PRN   


 


 Levothyroxine [Synthroid] Med  17 07:30 Active





 88 mcg PO ACBREAKFAST   


 


 NS + KCl 20mEq/L [Normal Saline with 20 mEq KCl] 1,000 Med  17 18:00 

Active





 ml   





 IV ASDIRECTED   


 


 Potassium Chloride [Klor-Con M20] Med  17 21:00 Active





 20 meq PO BID   


 


 Sulfurzyme Med  17 21:00 Active





 2 tab PO BID   


 


 traZODone Med  17 21:00 Active





 100 mg PO BEDTIME   








 Medication Orders





Acetaminophen (Tylenol Extra Strength)  1,000 mg PO Q6H PRN


   PRN Reason: Pain


   Last Admin: 17 23:11  Dose: 1,000 mg


Aspirin (Aspirin)  81 mg PO DAILY [facility]


Atenolol/Chlorthalidone (Tenoretic 100)  1 tab PO DAILY [facility]


Docusate Sodium (Colace)  100 mg PO DAILY PRN


   PRN Reason: Constipation


Enoxaparin Sodium (Lovenox)  40 mg SUBCUT DAILY [facility]


Potassium Chloride/Sodium Chloride (Normal Saline With 20 Meq Kcl)  1,000 mls @ 

60 mls/hr IV ASDIRECTED [facility]


   Last Admin: 17 20:00  Dose: 60 mls/hr


Levothyroxine Sodium (Synthroid)  88 mcg PO ACBREAKFAST [facility]


   Last Admin: 17 07:27  Dose: 88 mcg


Non-Formulary Medication 1 Each ( Agilease 1 Tab)**Own Med**  1 tab PO BID [facility]


   Last Admin: 17 21:16  Dose: 1 tab


Non-Formulary Medication 1 Each ( Sulfurzyme 2 Tab)** Own Med**  2 tab PO BID 

[facility]


   Last Admin: 17 21:17  Dose: 2 tab


Ondansetron HCl (Zofran Odt)  4 mg PO Q6H PRN


   PRN Reason: nausea, able to take PO


Polyethylene Glycol (Miralax)  17 gm PO DAILY PRN


   PRN Reason: Constipation


Potassium Chloride (Klor-Con M20)  20 meq PO BID [facility]


   Last Admin: 17 21:17  Dose: 20 meq


Trazodone HCl (Trazodone)  100 mg PO BEDTIME [facility]


   Last Admin: 17 21:18  Dose: 100 mg








Assessment/Plan Comment:: 





Admit the patient for physical rehabilitation and therapy. I'm concerned about 

warmth in the leg since she's previously had ulcers,and infection, there. We'll 

repeat a CBC and CMP, and make sure there is no infection brewing. Her back 

pain will be treated symptomatically with Tylenol or ibuprofen, and I believe 

physical therapy should help. I'll also continue her regular home medications, 

and consult with medical social worker to discuss disposition for this patient.

## 2017-07-15 NOTE — PN
DATE SEEN:  07/15/2017

 

CHIEF COMPLAINT:  Back pain.

 

HISTORY OF PRESENT ILLNESS:  This is a 71-year-old female complaining of back

pain.  She was not able to sleep well last night because of the back pain.  I

was also able to speak with the daughter who lives with her.  The weakness has

been ongoing, on and off for the last few weeks and also on the left leg.  It is

not painful.  She denies fever or chills.  She is voiding a lot more than 1800

mL overnight because of the IV fluids.

 

PAST MEDICAL HISTORY:  Hypertension, obesity, anxiety, depression, question

parkinsonism, and hypothyroidism.

 

SOCIAL HISTORY:  Lives at home does not smoke.

 

PHYSICAL EXAMINATION:  VITAL SIGNS:  Blood pressure is 125/71, temperature 97.5.

Oxygenation 95% on room air.  BACK:  Lower back, tenderness in the lumbar spine.

There has lumbar lordosis on inspection.  EXTREMITIES:  Global weakness about

4/5.  NEUROLOGIC:  No other focal findings.  MENTAL STATUS:  Alert, flat affect.

 

LABORATORY DATA:  No new labs today.

 

IMPRESSION:

1. Chronic back pain.

2. Stable hypertension.

3. Leg weakness.

4. Frequent falls.

5. Hypothyroidism.

6. History of hypokalemia.

 

PLAN:

1. I obtained an MRI of the lumbar spine on Monday, convert IV to Hep-Lock,

    and discontinue IV fluids.

2. Tramadol 50 mg q.i.d. and Flexeril 10 mg at bedtime and continue physical

    rehab.

 

Job#: [id number]

DD: 07/15/2017 0846

DT: 07/15/2017 1027 TN/TULIO

## 2017-07-16 NOTE — PN
DATE SEEN:  07/16/2017

 

CHIEF COMPLAINT:  Rash.

 

HISTORY OF PRESENT ILLNESS:  This is a 71-year-old female with a rash to the

hands from eczema.  She is asking for cream.  She has obesity, stable.  She has

chronic back pain that has improved on tramadol, but she has weakness of the

lower legs.

 

REVIEW OF SYSTEMS:  No recent fever or chills.  No nausea or vomiting.

 

MEDICATIONS:  Reviewed.

 

PHYSICAL EXAMINATION:  GENERAL:  She is not in distress.  She is afebrile.

VITAL SIGNS:  Blood pressure is normal.  MENTAL STATUS:  Alert, answers question

appropriately.  SKIN:  She had some eczema patches of dry skin on the right

hand.

 

IMPRESSION:

1. Back pain, chronic, stable.

2. Leg weakness, bilateral, unclear reason.

3. Eczematous dermatitis.

4. Obesity.

 

PLAN:  Triamcinolone cream to apply to the area twice a day.  We will try to

obtain an MRI tomorrow to determine the cause of the weakness of the legs.

 

Job#: [id number]

DD: 07/16/2017 1610

DT: 07/16/2017 1732 TN/TULIO

## 2017-07-17 NOTE — PN
DATE SEEN:  07/17/2017

 

CHIEF COMPLAINT:  Low back pain.

 

HISTORY OF PRESENT ILLNESS:  A 71-year-old female has been admitted for back

pain and weakness of the legs.  She has been unable to stand on self or bear

weight in the lower extremities.  Pain is controlled on tramadol.

 

REVIEW OF SYSTEMS:  No urinary symptoms.  No fever or chills.

 

MEDICATIONS:  Reviewed.

 

PHYSICAL EXAMINATION:  GENERAL:  She is pleasant.  VITAL SIGNS:  Her blood

pressure is 100/55, pulse 63, and temp 97.6.  BACK:  Lower back lumbar lordosis.

EXTREMITIES:  Minimal edema.  Mild redness.  No warmth.  MENTAL STATUS:  Flat

affect.  Normal intelligence memory.

 

IMPRESSION:

1. Chronic back pain.

2. Leg weakness.

3. Obesity.

 

PLAN:  MRI possibly today.  I suggest that we will discharge the patient today

to swing bed for physical rehab.  As we wait for the MRI results, continue the

current medications.

 

Job#: [id number]

DD: 07/17/2017 0831

DT: 07/17/2017 0845 TN/[email or web address]

## 2017-07-18 NOTE — PN
DATE SEEN:  07/18/2017

 

SUBJECTIVE:  I spoke with the neurosurgeon today after I reviewed the MRI.  The

neurosurgeon looked at the MRI and there is a large disk that is displaced to

the left.  He wanted to know if the patient has pain and weakness mostly on the

left, which in this case the patient only complains of back pain and weakness of

the legs, both.  As such, he recommended we try conservative measure like

Neurontin, NSAIDs, or Medrol Dosepak, and if her symptoms get worse especially

if she develops pain in the left leg, then I think she will be a candidate for

surgical intervention.  I ordered prednisone for 5 days and discontinue

tramadol.

 

Job#: [id number]

DD: 07/18/2017 0930

DT: 07/18/2017 0942 TN/TULIO

## 2017-07-18 NOTE — DISCH
DISCHARGE DATE:  07/17/2017

 

REASON FOR ADMISSION:

1. Weakness of the legs.

2. Back pain.

3. Obesity.

4. Eczema.

 

DISCHARGE DIAGNOSES:

1. Weakness of the legs.

2. Back pain.

3. Obesity.

4. Eczema.

 

CONSULTATIONS:  Physical Therapy.

 

BRIEF HISTORY AND HOSPITAL COURSE:  A 71-year-old female, who lives at home

alone with her daughter.  She was brought in because she was unable to support

herself with the legs.  She also had chronic back pain.  She has a nonhealing

ulcer on the leg that has been stable recently.

 

REVIEW OF SYSTEMS:  No fever or chills.  No urinary symptoms.

 

HOSPITAL COURSE:  She did well with the exception of not able to walk.  An MRI

has been ordered today, but it is deemed necessary that she go to swing bed for

rehab.

 

DISCHARGE MEDICATIONS:  Tramadol 50 mg t.i.d. p.r.n., triamcinolone 0.1% cream

b.i.d., MiraLAX 17 g p.o. every night p.r.n., levothyroxine 88 mcg daily,

docusate 100 mg daily p.r.n., Flexeril 10 mg at bedtime, atenolol 100 mg daily,

chlorthalidone 25 mg a day, aspirin 81 mg a day, Agilease one tablet b.i.d., and

trazodone 100 mg at bedtime.

 

FOLLOW UP:  We will continue the swing bed.  Followup with Physical Therapy.  I

will obtain the MRI hopefully today or tomorrow to delineate further treatment.

 

I spent more than 35 minutes in the discharge of the patient.

 

Job#: [id number]

DD: 07/17/2017 1225

DT: 07/17/2017 1346 TN/TULIO

## 2017-07-21 NOTE — PN
DATE SEEN:  2017

 

HISTORY:  Jenna is a 71-year-old woman from [facility], who was had

increasing and progressive leg weakness and low back pain for the past couple of

years.  She had MRI back in  which showed multilevel lumbar disk disease

with a moderate sized L2-L3 disk herniation with inferior migration.  She has

had conservative treatment with physical therapy but seemed to slowly gotten

worse.

 

The patient was admitted to Acute Care at [facility] from the ER on 2017

because of weakness, she had up to that time been able to walk with a walker,

but now cannot get herself up, walk unassisted etc.  For this reason, she was

admitted to acute care for further investigation and on , she was

transferred to swing bed for continued therapy.  MRI of the lumbar spine has

been repeated and shows again the large disk migration from L2-L3 downwards.

The patient says the back pain is intermittent and at times when she does not

have back pain, she is still having significant weakness with inability to walk.

She also reports her arms seemed to be weak such that she has had, but she is

still able to use her walker.

 

PAST MEDICAL HISTORY:  Positive for type 2 diabetes.  She has a history of a

DVT.  She has had total knee arthroplasties bilaterally.  She is status post

TAHBSO for benign reasons and has had previous ankle sprains.  She has chronic

essential hypertension, GERD, overactive bladder, osteoarthritis, depression,

hypothyroidism, obesity, eczema and she is  1, para 1 with a normal

delivery.

 

REVIEW OF SYSTEMS:  Negative for recent infections.  No fever or chills.  She

occasionally gets right-sided neck pain with right-sided headache that she

attributes to osteoarthritis of the neck.  No sore throat or URI symptoms.  No

cough or purulent sputum.  No chest pain or palpitations.  No abdominal pain,

nausea, diarrhea, constipation, hematochezia, or melena.  No hematuria or UTI

symptoms.  No joint inflammation, swelling, skin rash, or mood instability.  She

does have some depression related to her ambulatory difficulties.

 

PHYSICAL EXAMINATION:  GENERAL:  She is alert and a good historian.  She is

somewhat slow in speech and activities and has parkinsonian appearing facial

features.  HEENT:  Shows TMs to be clear.  Pupils are equal and reactive.

Oropharynx clear with adequate mucous membrane moisture.  NECK:  Supple.

Thyroid is normal.  LUNGS:  Clear with good air movement to the bases.  HEART:

Regular without murmur, rub, or gallop.  ABDOMEN:  Obese, soft.  Normal bowel

sounds.  Nontender.  No masses.  No organomegaly.  EXTREMITIES:  Warm and well

perfused.  No significant edema.  NEUROLOGIC:  Reveals her mental status to be

intact.  Motor exam appears symmetric in the upper extremities.  Lower

extremities; however, she has apparent quads weakness bilaterally with left

greater than right.  She is able to dorsiflex both feet.  Reflexes absent at the

ankle jerks.

 

LABORATORY STUDIES:  White count 4900, hemoglobin 11.5, platelets 186.

Electrolytes normal.  Creatinine 0.8, magnesium 1.9.  Urinalysis clear.

 

ASSESSMENT:

1. A 71-year-old woman with progressive left leg weakness, ambulatory

    disability, low back pain, and evidence of chronic large left lumbar disk

    herniation.

2. History of type 2 diabetes.

3. Remote history of deep vein thrombosis left lower extremity.

4. Depression.

5. Chronic essential hypertension.

6. Osteoarthritis of the lumbar and cervical spines as well as knees.

7. Chronic hypothyroidism.

 

PLAN:

1. She has been started on a Medrol dose pack for the back pain as well as

    getting physical therapy and rehab.  If not making progress with this

    conservative therapy, consider a neurosurgical referral.

2. This patient will require several more days of swing bed therapy with the

    goal of returning to home again.

 

Job#: [id number]

DD: 2017 1327

DT: 2017 1530 JASPER/TULIO

## 2017-07-23 NOTE — PN
DATE SEEN:  07/23/2017

 

SUBJECTIVE:  Jenna is a 72-year-old, admitted to acute care with leg weakness.

She had back pain and MRI showing lumbar disk herniation at L2 with inferior

migration.  Phone consultation was held with the Neurosurgery Department by Dr. Luong, who recommended conservative treatment.  She has been getting physical

therapy and medications including oral steroid.  She seems to be improving and

her back pain is less, however, her leg weakness is very slow to improve.  She

does state that she is stronger now than she was a week ago.  However, she was

out on pass for her birthday yesterday and tolerated this satisfactorily.  She

remains in swing bed at [facility].

 

OBJECTIVE:  GENERAL:  She is alert.  She does have a parkinsonian appearance to

her face with slowed motor activity.  MOUTH:  Clear.  VITAL SIGNS:  Blood

pressure 123/67, pulse is 66 and regular, respirations 18, temperature 97.6.

NEUROLOGIC:  Motor exam, appears symmetric.  EXTREMITIES:  She has trace edema

at the ankles.

 

ASSESSMENT:  Lumbar disk disease with weakness, question underlying Parkinson's

or other neuromuscular disorder.

 

PLAN:  We will follow up lab testing in the morning.  Continue physical therapy.

She has an appointment set up with Neurosurgery in [place] on August 15th and

will also seek a neurological evaluation in [place].

 

We will continue to provide intervention for this in terms the of physical

therapy, medications, and also provide palliative care for underlying pain and

weakness.

 

Job#: [id number]

DD: 07/23/2017 1017

DT: 07/23/2017 1045 JASPER/TULIO

## 2017-07-24 NOTE — PN
DATE SEEN:  07/24/2017

 

SUBJECTIVE:  Jenna is a 72-year-old woman who has had progressive lower

extremity weakness and ambulatory difficulty over the past couple of years.  She

states that she had an injury to her low back where she was leaning forward to

start her car when she got a sudden severe pain in the low back, and she was

treated conservatively and MRI revealed disc bulges.  She underwent medication,

nerve block both analgesics and both narcotic analgesic, nerve blocking

medications, lumbar epidural [facility] but never

completely resolved the back pain.  Over the past few weeks, she has had slowly

worsening ambulation with weakness, particularly on the left leg side she says

that her left leg is untrustworthy and will give out when she walks.

 

She was admitted to [facility] on July 14 for the weakness.  She has been on

steroid and getting physical therapy with very minimal improvement.  She also

reports chronic problems with her bladder with incontinence and occasional stool

incontinence.

 

OBJECTIVE:  GENERAL:  The patient is alert and conversant.  She is mildly

forgetful for details.  VITAL SIGNS:  Blood pressure 123/87, pulse 66 and

regular, respirations 18, temp 97.6, O2 saturation 97% on room air.  SKIN:

Clear.  LUNGS:  Clear.  HEART:  Regular.  Respirations easy.  ABDOMEN:  Soft,

obese, nontender.  EXTREMITIES:  Showed trace edema at the ankles.  NEUROLOGIC:

Reveals significant weakness to quads testing left greater than right leg.  She

can cannot get her heel up off the floor from a sitting position on the left leg

but if I lift it up, she can hold it for a few seconds off the floor.  Right leg

she is able to extend her leg lifting the heel up off the floor.

 

Physical therapy treatment shows that she had difficulty with standing but could

stand on the 3rd try.  She walked in 10 feet with a walker, but was also noted

to have left greater than right leg weakness.

 

LABORATORY DATA:  CBCs normal.  Electrolytes normal.  CK normal.

 

ASSESSMENT:

1. Leg weakness with MRI evidence of a large lumbar disc herniation.

2. Chronic essential hypertension.

3. Bladder incontinence.

4. Osteoarthritis of the lumbar spine.

5. Hypothyroidism.

 

PLAN:  She has a neurosurgical appointment set up for mid-August.  We will keep

that in place; however, additionally I will try to get her in sooner.

 

Additionally, I will discuss with Neurosurgery possible sooner evaluation for

consideration of surgical treatment of her lumbar disc herniation.  We will

continue her steroid and physical therapy use in the meantime.

 

Job#: [id number]

DD: 07/24/2017 0855

DT: 07/24/2017 0929 JASPER/TULIO

## 2017-07-24 NOTE — PN
DATE SEEN:  07/24/2017

 

SUBJECTIVE:  Jenna is in with leg weakness and enlarged left lumbar disk

herniation.  She is made slight but slow progress in physical therapy.  Phone

consultation was held with Dr. Bah at [facility] in [place] from Neurosurgery.

He recommends follow up with an appointment in Neurosurgical Clinic and has

offered if any discrete worsening, visit to the emergency room with potential or

urgent neurosurgical consultation.  I have discussed this with Jenna and her

daughter and for now, we will keep the appointment as scheduled with

Neurosurgery at [place] on August 15, 2017.  She also had a bladder residual of

200 mL today and I do not think that she is having bladder dysfunction secondary

to lumbar disk disease.  Continue therapy and medications.

 

Job#: [id number]

DD: 07/24/2017 1342

DT: 07/24/2017 1405 JASPER/TULIO

## 2017-07-27 NOTE — PN
DATE SEEN:  07/27/2017

 

SUBJECTIVE:  Jenna is a 72-year-old woman with a large lumbar disk herniation,

who has been treated with conservative therapy.  She has significant quad

weakness on the left leg.  MRI has been done and has confirmed the disk problem.

She has an appointment scheduled for neurosurgical evaluation at [facility], in approximately 2 weeks.

 

Jenna has been getting physical therapy.  She has been on oral steroid.  She is

improving according to her and Physical Therapy.  She still states that at times

her left leg will want to give out when she is walking.

 

OBJECTIVE:  GENERAL:  She is alert and comfortable.  She is a good historian.

VITAL SIGNS:  Pulse is regular.  RESPIRATIONS:  Easy and nonlabored.  MOTOR

EXAM:  Reveals continued leg weakness with left quad weakness prominent.

 

LABORATORY DATA:  Sedimentation rate 17.  Electrolytes normal.  Creatinine 0.9.

TSH 0.56.

 

Echocardiogram has been done showing an ejection fraction of 55%.  Normal valves

and mild diastolic dysfunction, but no wall motion abnormalities.

 

ASSESSMENT:

1. Large lumbar disk herniation with left quadriceps weakness secondary to

    impingement neuropathy.

2. Chronic low back pain.

3. Obesity.

4. Mild dependant peripheral edema with normal cardiac output.

5. Hypothyroidism, adequately replaced.

 

PLAN:  We will continue physical therapy with plans for her to return to her

home next week.  We will continue to provide palliative measures for her back

pain and underlying medical problems.

 

Job#: [id number]

DD: 07/27/2017 1255

DT: 07/27/2017 1319 RO/MODL

## 2017-07-30 NOTE — PCM.PN
- General Info


Date of Service: 07/30/17


Admission Dx/Problem (Free Text): 


Patient states she has some left hip and knee pain. Her low back pain is 

improved. She says she is still very weak. She thinks she has arthritis left 

hip and knee. She's had x-rays in the past that showed up. No new injury to her 

left near hip.








- Patient Data


Vitals - Most Recent: 


 Last Vital Signs











Temp  97.5 F   07/30/17 07:35


 


Pulse  76   07/30/17 08:35


 


Resp  16   07/30/17 07:35


 


BP  116/69   07/30/17 08:35


 


Pulse Ox  98   07/30/17 07:35











Weight - Most Recent: 310 lb 14.4 oz


Med Orders - Current: 


 Current Medications





Acetaminophen (Tylenol Extra Strength)  1,000 mg PO Q6H PRN


   PRN Reason: Pain


   Last Admin: 07/30/17 15:27 Dose:  1,000 mg


Artificial Tears (Refresh Tears 0.5%)  0 ml EYEBOTH ASDIRECTED PRN


   PRN Reason: Dryness


   Last Admin: 07/27/17 20:08 Dose:  1 drop


Aspirin (Halfprin)  81 mg PO DAILY [facility]


   Last Admin: 07/30/17 08:33 Dose:  81 mg


Atenolol (Tenormin)  50 mg PO DAILY [facility]


   Last Admin: 07/30/17 08:35 Dose:  50 mg


Calcium Carbonate (Calcium Carbonate/Vitamin D 1250 Mg-200 Unit)  2 tab PO 

DAILY [facility]


   Last Admin: 07/30/17 08:32 Dose:  2 tab


Cholecalciferol (Vitamin D3)  1,000 units PO DAILY [facility]


   Last Admin: 07/30/17 08:36 Dose:  1,000 units


Ciclopirox Olamine (Loprox 0.77% Crm)  0 gm TOP BID [facility]


   Stop: 08/01/17 23:59


   Last Admin: 07/30/17 08:33 Dose:  1 applic


Diclofenac Sodium (Voltaren)  75 mg PO BIDMEALS PRN


   PRN Reason: Breakthrough Pain


   Last Admin: 07/29/17 13:45 Dose:  75 mg


Docusate Sodium (Colace)  100 mg PO DAILY PRN


   PRN Reason: Constipation


Fish Oil (Fish Oil)  1 gm PO DAILY [facility]


   Last Admin: 07/30/17 08:33 Dose:  1 gm


Levothyroxine Sodium (Synthroid)  88 mcg PO ACBREAKFAST [facility]


   Last Admin: 07/30/17 07:56 Dose:  88 mcg


Multivitamins/Minerals/Vitamin C (Tab-A-Jaimee)  1 tab PO DAILY [facility]


   Last Admin: 07/30/17 08:34 Dose:  1 tab


Agilease **Patient's (Own Med**)  1 each PO BID [facility]


   Last Admin: 07/30/17 08:34 Dose:  1 each


Sulfurzyme **Patient 's Own Medication 1 Each  2 each PO BID [facility]


   Last Admin: 07/30/17 08:34 Dose:  2 each


Trazodone HCl (Trazodone)  100 mg PO BEDTIME [facility]


   Last Admin: 07/29/17 21:16 Dose:  100 mg


Triamcinolone Acetonide (Triamcinolone Acetonide 0.1% Crm)  0 gm TOP BID [facility]


   Last Admin: 07/30/17 08:36 Dose:  Not Given





Discontinued Medications





Atenolol (Tenormin)  100 mg PO DAILY [facility]


   Last Admin: 07/23/17 08:34 Dose:  100 mg


Atenolol (Tenormin)  50 mg PO DAILY [facility]


Cyclobenzaprine HCl (Flexeril)  10 mg PO BEDTIME [facility]


   Last Admin: 07/25/17 20:17 Dose:  10 mg


Dexamethasone (Dexamethasone)  2 mg PO BID [facility]


   Stop: 07/27/17 06:00


   Last Admin: 07/26/17 21:13 Dose:  2 mg


Methylprednisolone (Medrol)  0 mg PO QIDPCANDBED [facility]


   PRN Reason: Protocol


   Stop: 07/20/17 21:01


   Last Admin: 07/20/17 20:27 Dose:  1 tab


Methylprednisolone (Medrol)  0 mg PO 0800,1300,2100 SH[facility]


   PRN Reason: Protocol


   Stop: 07/21/17 21:01


   Last Admin: 07/21/17 21:09 Dose:  1 tab


Methylprednisolone (Medrol)  0 mg PO 0800,2100 [facility]


   PRN Reason: Protocol


   Stop: 07/22/17 21:01


   Last Admin: 07/23/17 08:30 Dose:  1 tab


Methylprednisolone (Medrol)  0 mg PO 0800 [facility]


   PRN Reason: Protocol


   Stop: 07/23/17 08:01


   Last Admin: 07/23/17 08:31 Dose:  4 mg


Methylprednisolone (Medrol)  0 mg PO QIDPCANDBED [facility]


   PRN Reason: Protocol


   Stop: 07/18/17 13:01


   Last Admin: 07/18/17 12:40 Dose:  3 tab


Tramadol HCl (Ultram)  50 mg PO Q6H PRN


   PRN Reason: Breakthrough Pain


   Last Admin: 07/18/17 05:03 Dose:  50 mg











- Exam


General: Alert, Oriented, Cooperative


Extremities: Other (Left knee and left hip exam normal)





- Problem List & Annotations


(1) Weakness


SNOMED Code(s): 31120658


   Code(s): R53.1 - WEAKNESS   Status: Acute   Current Visit: No   





(2) Back pain


SNOMED Code(s): 866063300


   Code(s): M54.9 - DORSALGIA, UNSPECIFIED   Status: Chronic   Current Visit: 

No   





- Problem List Review


Problem List Initiated/Reviewed/Updated: Yes





- Plan


Plan:: 


Continue current care.

## 2017-08-07 NOTE — PN
DATE SEEN:  08/07/2017

 

SUBJECTIVE:  Jenna Abarca is a 72-year-old  female who has been in

swing bed for some duration.  Complicated back pain and general disability.

 

Plan is for discharge later this week.  Medications reviewed and appropriate.

 

Laboratory studies, none outstanding since her admission.

 

OBJECTIVE:  VITAL SIGNS:  36.6, 117/67, 83 is the mean blood pressure,

respirations 18, O2 95%.  GENERAL:  Cooperative and conversant, sitting in a

chair.  CHEST:  Clear.  HEART:  Regular.  ABDOMEN:  Benign.  EXTREMITIES:

Suggest ulcer of left medial ankle revealed excoriated lesion only.

MUSCULOSKELETAL:  Complicated back pain, with disability.

 

PLAN:  Medications, care and treatment appropriate, plan is discharge as noted.

 

Job#: [id number]

DD: 08/07/2017 0948

DT: 08/07/2017 1009 MULU/TULIO

## 2017-08-08 NOTE — PN
DATE SEEN:  08/08/2017

 

SUBJECTIVE:  Jenna Abarca is a 72-year-old  female.  She has had a

long-term acute care and now swing bed stay.

 

Getting better.

 

Home health is actively involved.  Planning discharge tomorrow.

 

Diagnostic studies, none recently performed.  Last lab work was done on July 24

and 27 complementary.

 

OBJECTIVE:  VITAL SIGNS:  118/72, 87 is the mean blood pressure, 75 is the

pulse, most recent O2 saturation 95% on room air.  GENERAL:  In good spirits.

Sitting in a wheelchair.  NECK:  Benign.  Thyroid small.  CHEST:  Clear.  HEART:

Regular.  Antalgic gait.

 

ASSESSMENT:  Complicated back disorder, chronic pain syndrome.

 

PLAN:  Medications, care and treatment appropriate, discharge planning in place.

 

Job#: [id number]

DD: 08/08/2017 0838

DT: 08/08/2017 0927 MULU/TULIO

## 2017-08-10 NOTE — DISCH
DISCHARGE DATE:  08/09/2017

 

HOSPITAL COURSE:  Jenna Abarca is a 72-year-old  female, who was a

direct admission from acute care to swing bed.

 

She presented with complicated weakness, complicated back pain, and non-surgical

disk disease.

 

She had a lengthy swing bed stay, PT/OT was actively involved, progress was

appropriate, medications were reviewed, and all went well.

 

DISPOSITION:  At the time of discharge, she will be discharged home to Home

Health intervention and care.  Close observation.

 

FOLLOWUP:  Follow up in two weeks' time.

 

We spoke to insomnia and bladder discomfort, taken under advisement.

 

SURGICAL PROCEDURES:  None.

 

CONSULTATIONS:  None.

 

Job#: [id number]

DD: 08/09/2017 0900

DT: 08/09/2017 1053 MULU/TULIO

## 2020-07-08 ENCOUNTER — HOSPITAL ENCOUNTER (INPATIENT)
Dept: HOSPITAL 7 - FB.ED | Age: 75
LOS: 1 days | Discharge: NURSING, REHAB OR LONG-TERM CARE | DRG: 563 | End: 2020-07-09
Attending: FAMILY MEDICINE | Admitting: FAMILY MEDICINE
Payer: MEDICARE

## 2020-07-08 DIAGNOSIS — N32.81: ICD-10-CM
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
## 2020-07-08 DIAGNOSIS — Z79.890: ICD-10-CM

## 2020-07-08 DIAGNOSIS — E66.9: ICD-10-CM

## 2020-07-08 DIAGNOSIS — R82.71: ICD-10-CM

## 2020-07-08 DIAGNOSIS — Z79.899: ICD-10-CM

## 2020-07-08 DIAGNOSIS — Z91.040: ICD-10-CM

## 2020-07-08 DIAGNOSIS — I10: ICD-10-CM

## 2020-07-08 DIAGNOSIS — Z96.659: ICD-10-CM

## 2020-07-08 DIAGNOSIS — E66.09: ICD-10-CM

## 2020-07-08 DIAGNOSIS — Z79.82: ICD-10-CM

## 2020-07-08 DIAGNOSIS — L30.9: ICD-10-CM

## 2020-07-08 DIAGNOSIS — M11.9: ICD-10-CM

## 2020-07-08 DIAGNOSIS — S43.004A: Primary | ICD-10-CM

## 2020-07-08 DIAGNOSIS — Z90.710: ICD-10-CM

## 2020-07-08 DIAGNOSIS — F33.9: ICD-10-CM

## 2020-07-08 DIAGNOSIS — M54.9: ICD-10-CM

## 2020-07-08 DIAGNOSIS — R32: ICD-10-CM

## 2020-07-08 DIAGNOSIS — E03.9: ICD-10-CM

## 2020-07-08 DIAGNOSIS — Z88.1: ICD-10-CM

## 2020-07-08 DIAGNOSIS — G20: ICD-10-CM

## 2020-07-08 DIAGNOSIS — K21.9: ICD-10-CM

## 2020-07-08 DIAGNOSIS — Z86.718: ICD-10-CM

## 2020-07-08 DIAGNOSIS — H54.7: ICD-10-CM

## 2020-07-08 DIAGNOSIS — G89.29: ICD-10-CM

## 2020-07-08 DIAGNOSIS — R29.6: ICD-10-CM

## 2020-07-08 DIAGNOSIS — F32.9: ICD-10-CM

## 2020-07-08 NOTE — EDM.PDOC
ED HPI GENERAL MEDICAL PROBLEM





- General


Stated Complaint: SHOULDER


Time Seen by Provider: 20 22:55


Source of Information: Reports: Patient


History Limitations: Reports: No Limitations





- History of Present Illness


INITIAL COMMENTS - FREE TEXT/NARRATIVE: 





Patient presented to the ED because a rt shoulder injury. He tripped and fell in

 her bathroom 2 days ago and landed on her rt shoulder. The pain is sharp, 6/10,

worse with movements.





- Related Data


                                    Allergies











Allergy/AdvReac Type Severity Reaction Status Date / Time


 


amoxicillin Allergy  Rash Verified 17 16:31


 


latex Allergy  Cough Verified 17 16:31











Home Meds: 


                                    Home Meds





Aspirin 81 mg PO DAILY 17 [History]


Calcium Carb/Vit D3/Minerals [Calcium 600+D Plus Minerals] 1,200 mg PO DAILY 

17 [History]


Cholecalciferol (Vitamin D3) [Vitamin D3] 1,000 unit PO DAILY 17 [History]


Dextran 70/Hypromellose/PF [Artificial Tears Drops] 1 each EYEBOTH BID PRN 

17 [History]


Levothyroxine [Synthroid] 88 mcg PO ACBREAKFAST 17 [History]


Multivitamin [Multi-Vitamin Daily] 1 tab PO DAILY 17 [History]


Omega-3S/DHA/Epa/Fish Oil [Omega-3 Fish Oil 1,200 mg Sfgl] 1,200 mg PO DAILY 

17 [History]


traZODone HCl [Trazodone HCl] 100 mg PO BEDTIME 17 [History]


Sulfurzyme 2 tab PO BID 17 [History]


Acetaminophen [Tylenol Extra Strength] 1,000 mg PO Q6H PRN #0 tablet 17 

[Rx]


Docusate Sodium [Colace] 100 mg PO DAILY PRN 17 [History]


Carboxymethylcellulose Sodium [Refresh Tears 0.5%] 1 drop EYEBOTH ASDIRECTED PRN

17 [History]


Cyclobenzaprine [Flexeril] 10 mg PO BEDTIME #30 tab 17 [Rx]


Diclofenac Sodium [IJD: Diclofenac Sodium] 75 mg PO .TWICE DAILY W MEALS PRN #20

tab.ec 17 [Rx]


Triamcinolone Acetonide [IJD: Triamcinolone Acetonide 0.1% Crm] 0 gm TOP BID #1 

tube 17 [Rx]


traMADol [Ultram] 50 mg PO Q6H PRN #30 tablet 17 [Rx]


Patient's Own Medication [Ptom] 1 each PO BID  each 17 [Rx]


Cyclobenzaprine [Flexeril] 10 mg PO TID #15 tab 20 [Rx]


traMADol [Ultram] 100 mg PO Q8H PRN #15 tab 20 [Rx]











Past Medical History





- Past Health History


Medical/Surgical History: Denies Medical/Surgical History


HEENT History: Reports: Cataract


Other HEENT History: Pt wears glasses.


Cardiovascular History: Reports: Blood Clots/VTE/DVT, Hypertension


Gastrointestinal History: Reports: GERD


Genitourinary History: Reports: Urinary Incontinence, Other (See Below)


Other Genitourinary History: overactive bladder


OB/GYN History: Reports: Pregnancy, Other (See Below)


Other OB/GYN History: 


Musculoskeletal History: Reports: Back Pain, Chronic, Osteoarthritis


Psychiatric History: Reports: Depression


Endocrine/Metabolic History: Reports: Hypothyroidism, Obesity/BMI 30+


Dermatologic History: Reports: Eczema


Other Dermatologic History: venous ulcer to L medial malleolus & has been 

treating for past 1yr.





- Infectious Disease History


Infectious Disease History: Reports: Chicken Pox, Measles, Mumps, Rubella





- Past Surgical History


HEENT Surgical History: Reports: None


Cardiovascular Surgical History: Reports: None


GI Surgical History: Reports: None


Female  Surgical History: Reports: Hysterectomy


Endocrine Surgical History: Reports: None


Musculoskeletal Surgical History: Reports: Knee Replacement


Dermatological Surgical History: Reports: None





Social & Family History





- Family History


Family Medical History: Noncontributory





- Caffeine Use


Caffeine Use: Reports: None





ED ROS GENERAL





- Review of Systems


Review Of Systems: See Below


Constitutional: Reports: No Symptoms


HEENT: Reports: No Symptoms


Respiratory: Reports: No Symptoms


Cardiovascular: Reports: No Symptoms


Endocrine: Reports: No Symptoms


GI/Abdominal: Reports: No Symptoms


: Reports: No Symptoms


Musculoskeletal: Reports: No Symptoms


Skin: Reports: No Symptoms


Neurological: Reports: No Symptoms


Psychiatric: Reports: No Symptoms





ED EXAM, UPPER BACK/NECK PAIN





- Physical Exam


Exam: See Below


General Appearance: Alert, No Apparent Distress


Ears Exam: Normal External Exam, Normal Canal, Hearing Grossly Normal


Nose Exam: Normal Inspection, Normal Mucousa, No Blood


Throat/Mouth Exam: Normal Inspection, Normal Lips, Normal Teeth, Normal Gums, 

Normal Oropharynx, Normal Voice, No Airway Compromise


Head Exam: Atraumatic, Normocephalic


Neck Exam: Non-Tender, Full Range of Motion, Normal Alignment


Cardiovascular/Respiratory: Regular Rate, Rhythm, Normal Peripheral Pulses, No 

JVD


GI/Abdominal: Normal Bowel Sounds, Soft, Non-Tender, No Organomegaly


Back Exam: Normal Inspection, Full Range of Motion


Extremities: Normal Inspection, Joint Swelling, Arm Pain





Course





- Vital Signs


Text/Narrative:: 





xray rt shoulder-ant shoulder dislocation


Percocet 5/325-2 po x1


---------------------------------------


see CRNA notes for details of sedation


Manual reduction of right anterior dislocation.





- Orders/Labs/Meds


Orders: 


                               Active Orders 24 hr











 Category Date Time Status


 


 Shoulder 1V Rt [CR] Stat Exams  20 23:55 Taken


 


 Shoulder Comp Rt [CR] Stat Exams  20 22:44 Taken


 


 Ketorolac [Toradol] Med  20 00:19 Stat





 15 mg IVPUSH NOW STA   


 


 Sodium Chloride 0.9% [Saline Flush] Med  20 23:24 Active





 10 ml FLUSH ASDIRECTED PRN   


 


 Saline Lock Insert [OM.PC] Routine Oth  20 23:24 Ordered








                                Medication Orders





Sodium Chloride (Saline Flush)  10 ml FLUSH ASDIRECTED PRN


   PRN Reason: Keep Vein Open








Meds: 


Medications











Generic Name Dose Route Start Last Admin





  Trade Name Freq  PRN Reason Stop Dose Admin


 


Sodium Chloride  10 ml  20 23:24 





  Saline Flush  FLUSH  





  ASDIRECTED PRN  





  Keep Vein Open  














Discontinued Medications














Generic Name Dose Route Start Last Admin





  Trade Name Freq  PRN Reason Stop Dose Admin


 


Cyclobenzaprine HCl  10 mg  20 23:11  20 23:22





  Flexeril  PO  20 23:12  10 mg





  ONETIME ONE   Administration


 


Ketorolac Tromethamine  15 mg  20 00:19 





  Toradol  IVPUSH  20 00:20 





  NOW STA  


 


Oxycodone/Acetaminophen  2 tab  20 22:43  20 22:50





  Percocet 325-5 Mg  PO  20 22:44  2 tab





  NOW STA   Administration














Departure





- Departure


Time of Disposition: 12:00


Disposition: Home, Self-Care 01


Condition: Good


Clinical Impression: 


 Shoulder strain, Anterior dislocation of right shoulder








- Discharge Information


Prescriptions: 


Cyclobenzaprine [Flexeril] 10 mg PO TID #15 tab


traMADol [Ultram] 100 mg PO Q8H PRN #15 tab


 PRN Reason: Pain


Instructions:  Muscle Strain, Easy-to-Read


Referrals: 


Tyshawn Soto MD [Primary Care Provider] - 


Additional Instructions: 


Please read discharge instructions on shoulder strain and dislocation


Apply ice or heat whichever makes the pain feel better


IBuprofen  800 mg with tylenol 1000 mg every 8 hours as needed for pain


Flexeril 10 mg every 8 hours as needed for spasm/pain


Follow up if symptoms persist after 1 week





- My Orders


Last 24 Hours: 


My Active Orders





20 22:44


Shoulder Comp Rt [CR] Stat 





20 23:24


Sodium Chloride 0.9% [Saline Flush]   10 ml FLUSH ASDIRECTED PRN 


Saline Lock Insert [OM.PC] Routine 





20 23:55


Shoulder 1V Rt [CR] Stat 





20 00:19


Ketorolac [Toradol]   15 mg IVPUSH NOW STA 














- Assessment/Plan


Last 24 Hours: 


My Active Orders





20 22:44


Shoulder Comp Rt [CR] Stat 





20 23:24


Sodium Chloride 0.9% [Saline Flush]   10 ml FLUSH ASDIRECTED PRN 


Saline Lock Insert [OM.PC] Routine 





20 23:55


Shoulder 1V Rt [CR] Stat 





20 00:19


Ketorolac [Toradol]   15 mg IVPUSH NOW STA

## 2020-07-09 VITALS — DIASTOLIC BLOOD PRESSURE: 79 MMHG | SYSTOLIC BLOOD PRESSURE: 157 MMHG

## 2020-07-09 VITALS — HEART RATE: 76 BPM

## 2020-07-09 NOTE — CR
INDICATION:  Right shoulder injury. 



RIGHT SHOULDER:  Frontal and Y-views of the right shoulder revealed what appears
to be an anterior dislocation or subluxation of the humerus with respect to the 
glenoid fossa.  



Degenerative changes are noted at the glenohumeral joint and AC joint.  



The dislocation is new compared with the previous study of the chest dated 
03/01/17.  



IMPRESSION:   Anterior, slightly inferior dislocation at the glenohumeral joint.


MTDD

## 2020-07-09 NOTE — DISCH
DISCHARGE DATE:  07/09/2020

 

REASON FOR ADMISSION:  Shoulder dislocation.

 

DISCHARGE DIAGNOSES:

1. Shoulder dislocation.

2. Obesity.

3. Parkinson disease.

4. Ambulatory dysfunction.

 

PROCEDURE:  Shoulder reduction closed attempted.

 

CONSULTATIONS:  None.

 

BRIEF HISTORY:  A 74-year-old who fell 2 days ago, had a dislocation, which was

diagnosed in the ER this morning.  After several attempts of reduction, it

failed.  This evening, I used monitored anesthesia and attempted relocation.

There was some success, but not completely achieved.  As such, we made a

decision to transfer to [place], and the patient will be going by ambulance.

 

I spent more than 35 minutes in the discharge of the patient.

 

Job#: [id number]

DD: 07/09/2020 1749

DT: 07/09/2020 1812 TN/TULIO

## 2020-07-09 NOTE — PCM.SN.2
- Free Text/Narrative


Note: 


ANESTHESIA OFF-SITE SERVICE








Date: 07/09/2020


Time: 1650 to 1732





Dx: Right Dislocated shoulder without a Fracture


Rx: Closed Reduction of Right Shoulder Requiring Anesthesia





Procedure: Closed Reduction of the Right Shoulder Requiring Anesthesia





I was called for deep sedation in the ED by Dr. Luong after a previous 

multiple attempts to reduce the shoulder occurring around 2345 07/08/2020 by 

another ED physician.  This patient ate around 1100 today, so the NPO guidelines

required doing this procedure at 1700.  All anesthesia preoperative workup and 

anesthesia consents are still within 24 hours. She is an ASA 3 with a class 3 

airway and a severe overbite.  Heart rate is RR&R and her lungs are CTA.





She was brought over to Trauma Room 2 for this procedure. Standard anesthesia 

monitoring was used including O2 per a simple face mask at 10 L/M rate.  Her 

floor IV was infiltrated and a new one was started X 3 attempts.  See nursing 

notes for vital documentation.  Her B/P ran from 142/82 to 208/67, SpO2 was 95 

to 100% and the heart rate was 66 to 81 beats per minute.  She had spontaneous 

respirations throughout the procedure.





Monitored Anesthesia Care was provided by me using a total of 300 mg's of 

Propofol and 20 mg's of 1% Lidocaine Plain was given IV in divided doses.  The 

procedure time was 1656 to 1729 with multiple attempts with some success with 

the last try.  The patient quickly emerged and complaining of right shoulder, 

neck, and back pain. Dr. Luong was made aware and ordered IV Fentanyl for 

her.  There was no apparent anesthesia complications and was awake/orientated 

[no change from baseline].








Tyshawn Rodriguez CRNA NATALIE

## 2020-07-09 NOTE — CR
INDICATION:  Post-reduction.



RIGHT SHOULDER:  Two post-reduction images were obtained 07/08/20 with two sets 
of images - post-reduction #1 and post-reduction #2 which were obtained at 2336 
hours and 2346 hours and compared with the same date examination of 2255 hours. 
The dislocation has not been reduced on the two post-reduction images.  No 
change in the position of the humerus with respect to the glenoid is seen.  



IMPRESSION:  Post-reduction images do not show reduction of the  previously 
diagnosed right glenohumeral dislocation.  



Report was called to Dr. Last at 1037 hours. 

 

[place]

## 2020-07-09 NOTE — PCM.HP.2
H&P History of Present Illness





- General


Date of Service: 20


Admit Problem/Dx: 


                           Admission Diagnosis/Problem





Admission Diagnosis/Problem      Weakness








Source of Information: Patient, RN


History Limitations: Reports: No Limitations





- History of Present Illness


Initial Comments - Free Text/Narative: 


Jenna  is a 74-year-old female well-known to me. She tripped and fell on the [facility] shoulder about 2 days ago .She has had difficulty moving it since.IN the ER 

last night, she was found to have shoulder dislocation ,right,which was reduced 

by Dr. Luna.Jenna has had difficulty with ambulation and multiple falls at 

home. She has morbid obesity, dysthymia, and uncontrolled Parkinson's disease. 

Furthermore she's had chronic pain and polyarthralgia that is difficult control.

She denies any fever chills or respiratory symptoms. Her urine was found to be 

foul-smelling.Jenna lives with her daughter,Toya,and her family


  ** Right Shoulder


Pain Score (Numeric/FACES): 10





- Related Data


Allergies/Adverse Reactions: 


                                    Allergies











Allergy/AdvReac Type Severity Reaction Status Date / Time


 


amoxicillin Allergy  Rash Verified 17 16:31


 


latex Allergy  Cough Verified 17 16:31











Home Medications: 


                                    Home Meds





Aspirin 81 mg PO DAILY 17 [History]


Calcium Carb/Vit D3/Minerals [Calcium 600+D Plus Minerals] 1,200 mg PO DAILY 

17 [History]


Cholecalciferol (Vitamin D3) [Vitamin D3] 1,000 unit PO DAILY 17 [History]


Dextran 70/Hypromellose/PF [Artificial Tears Drops] 1 each EYEBOTH BID PRN 

17 [History]


Levothyroxine [Synthroid] 88 mcg PO ACBREAKFAST 17 [History]


Multivitamin [Multi-Vitamin Daily] 1 tab PO DAILY 17 [History]


Omega-3S/DHA/Epa/Fish Oil [Omega-3 Fish Oil 1,200 mg Sfgl] 1,200 mg PO DAILY 

17 [History]


traZODone HCl [Trazodone HCl] 100 mg PO BEDTIME 17 [History]


Sulfurzyme 2 tab PO BID 17 [History]


Acetaminophen [Tylenol Extra Strength] 1,000 mg PO Q6H PRN #0 tablet 17 

[Rx]


Docusate Sodium [Colace] 100 mg PO DAILY PRN 17 [History]


Carboxymethylcellulose Sodium [Refresh Tears 0.5%] 1 drop EYEBOTH ASDIRECTED PRN

17 [History]


Cyclobenzaprine [Flexeril] 10 mg PO BEDTIME #30 tab 17 [Rx]


Diclofenac Sodium [IJD: Diclofenac Sodium] 75 mg PO .TWICE DAILY W MEALS PRN #20

tab.ec 17 [Rx]


Triamcinolone Acetonide [IJD: Triamcinolone Acetonide 0.1% Crm] 0 gm TOP BID #1 

tube 17 [Rx]


traMADol [Ultram] 50 mg PO Q6H PRN #30 tablet 17 [Rx]


Patient's Own Medication [Ptom] 1 each PO BID  each 17 [Rx]


Cyclobenzaprine [Flexeril] 10 mg PO TID #15 tab 20 [Rx]


traMADol [Ultram] 100 mg PO Q8H PRN #15 tab 20 [Rx]


Carbidopa/Levodopa [Carbidopa-Levodopa  Tab] 3 cap PO Q3H 20 

[History]











Past Medical History





- Past Health History


Medical/Surgical History: Denies Medical/Surgical History


HEENT History: Reports: Cataract


Other HEENT History: Pt wears glasses.


Cardiovascular History: Reports: Blood Clots/VTE/DVT, Hypertension


Gastrointestinal History: Reports: GERD


Genitourinary History: Reports: Urinary Incontinence, Other (See Below)


Other Genitourinary History: overactive bladder


OB/GYN History: Reports: Pregnancy, Other (See Below)


Other OB/BYN History: 


Musculoskeletal History: Reports: Back Pain, Chronic, Osteoarthritis


Psychiatric History: Reports: Depression


Endocrine/Metabolic History: Reports: Hypothyroidism, Obesity/BMI 30+


Immunologic History: Reports: Other (See Below)


Other Immunologic History: Lupus


Dermatologic History: Reports: Eczema


Other Dermatologic History: venous ulcer to L medial malleolus & has been 

treating for past 1yr.





- Infectious Disease History


Infectious Disease History: Reports: Chicken Pox, Measles, Mumps, Rubella





- Past Surgical History


HEENT Surgical History: Reports: None


Cardiovascular Surgical History: Reports: None


GI Surgical History: Reports: None


Female  Surgical History: Reports: Hysterectomy


Endocrine Surgical History: Reports: None


Musculoskeletal Surgical History: Reports: Knee Replacement


Dermatological Surgical History: Reports: None





Social & Family History





- Family History


Family Medical History: Noncontributory





- Tobacco Use


Smoking Status *Q: Never Smoker





- Caffeine Use


Caffeine Use: Reports: None





- Recreational Drug Use


Recreational Drug Use: No





H&P Review of Systems





- Review of Systems:


Review Of Systems: Comprehensive ROS is negative, except as noted in HPI.





Exam





- Exam


Exam: See Below





- Vital Signs


Vital Signs: 


                                Last Vital Signs











Temp  97.5 F   20 08:40


 


Pulse  76   20 08:40


 


Resp  17   20 08:40


 


BP  102/57 L  20 08:40


 


Pulse Ox  92 L  20 08:40














- Exam


Quality Assessment: No: Supplemental Oxygen


General: Alert, Lethargic


HEENT: PERRLA


Neck: Supple


Lungs: Clear to Auscultation, Normal Respiratory Effort


Cardiovascular: Regular Rate


 (Female) Exam: Deferred


Back Exam: Normal Inspection


Extremities: Limited Range of Motion


Skin: Warm


Neurological: Cranial Nerves Intact, Normal Speech, Abnormal Gait.  No: Normal 

Gait


Neuro Extensive - Mental Status: Alert, Oriented x3, Memory Intact


Neuro Extensive - Motor, Sensory, Reflexes: CN II-XII Intact, Abnormal Gait


Psychiatric: Depressed





- Patient Data


Lab Results Last 24 hrs: 


                         Laboratory Results - last 24 hr











  20 Range/Units





  07:15 07:15 07:48 


 


WBC  7.4    (4.5-12.0)  X10-3/uL


 


RBC  3.75    (3.23-5.20)  x10(6)uL


 


Hgb  11.8    (11.5-15.5)  g/dL


 


Hct  36.0    (30.0-51.3)  %


 


MCV  95.9    (80-96)  fL


 


MCH  31.6    (27.7-33.6)  pg


 


MCHC  32.9    (32.2-35.4)  g/dL


 


RDW  13.3    (11.5-15.5)  %


 


Plt Count  222    (125-369)  X10(3)uL


 


MPV  8.1    (7.4-10.4)  fL


 


Neut % (Auto)  62.0    (46-82)  %


 


Lymph % (Auto)  19.2    (13-37)  %


 


Mono % (Auto)  13.7 H    (4-12)  %


 


Eos % (Auto)  4    (1.0-5.0)  %


 


Baso % (Auto)  1    (0-2)  %


 


Neut # (Auto)  4.6    (1.6-8.3)  #


 


Lymph # (Auto)  1.4    (0.6-5.0)  #


 


Mono # (Auto)  1.0    (0.0-1.3)  #


 


Eos # (Auto)  0.3    (0.0-0.8)  #


 


Baso # (Auto)  0.1    (0.0-0.2)  #


 


Sodium   143   (135-145)  mmol/L


 


Potassium   3.5   (3.5-5.3)  mmol/L


 


Chloride   107   (100-110)  mmol/L


 


Carbon Dioxide   29   (21-32)  mmol/L


 


BUN   14   (7-18)  mg/dL


 


Creatinine   0.9   (0.55-1.02)  mg/dL


 


Est Cr Clr Drug Dosing   TNP   


 


Estimated GFR (MDRD)   > 60   (>60)  


 


BUN/Creatinine Ratio   15.6   (9-20)  


 


Glucose   95   ()  mg/dL


 


Calcium   9.1   (8.6-10.2)  mg/dL


 


Total Bilirubin   0.5   (0.1-1.3)  mg/dL


 


AST   19   (5-25)  IU/L


 


ALT   9 L   (12-36)  U/L


 


Alkaline Phosphatase   65   ()  IU/L


 


Total Protein   6.5   (6.0-8.0)  g/dL


 


Albumin   3.3   (3.2-4.6)  g/dL


 


Globulin   3.2   g/dL


 


Albumin/Globulin Ratio   1.0   


 


Urine Color    Yellow  (YELLOW)  


 


Urine Appearance    Slightly cloudy  (CLEAR)  


 


Urine pH    7.0 H  (5.0-6.5)  


 


Ur Specific Gravity    1.010  (1.010-1.025)  


 


Urine Protein    Negative  (NEGATIVE)  mg/dL


 


Urine Glucose (UA)    Normal  (NORMAL)  mg/dL


 


Urine Ketones    Negative  (NEGATIVE)  mg/dL


 


Urine Occult Blood    Negative  (NEGATIVE)  


 


Urine Nitrite    Negative  (NEGATIVE)  


 


Urine Bilirubin    Negative  (NEGATIVE)  


 


Urine Urobilinogen    Normal  (NEGATIVE)  mg/dL


 


Ur Leukocyte Esterase    Small H  (NEGATIVE)  


 


Urine WBC    20-30 H  (0-5)  


 


Ur Squamous Epith Cells    Few H  (NS,R,O)  


 


Urine Bacteria    Many H  (NS)  











Result Diagrams: 


                                 20 07:15





                                 20 07:15





Sepsis Event Note





- Evaluation


Sepsis Screening Result: No Definite Risk





- Focused Exam


Vital Signs: 


                                   Vital Signs











  Temp Pulse Resp BP BP Pulse Ox Pulse Ox


 


 20 08:40  97.5 F  76  17  102/57 L   92 L 


 


 20 08:30        92 L


 


 20 00:22    24 H    100 


 


 20 23:31   71  16   135/80  96 


 


 20 22:46   71  18   144/69 H  96 











Date Exam was Performed: 20


Time Exam was Performed: 08:50





- Problem List


(1) Shoulder dislocation


SNOMED Code(s): 653727157, 427751635


   ICD Code: S43.006A - UNSP DISLOCATION OF UNSPECIFIED SHOULDER JOINT, INIT 

ENCNTR   Status: Acute   Current Visit: Yes   


Qualifiers: 


   Encounter type: initial encounter   Laterality: right   Qualified Code(s): 

S43.004A - Unspecified dislocation of right shoulder joint, initial encounter   





(2) Parkinson disease


SNOMED Code(s): 93892737


   ICD Code: G20 - PARKINSON'S DISEASE   Status: Chronic   Current Visit: Yes   





(3) GERD (gastroesophageal reflux disease)


SNOMED Code(s): 512644828


   ICD Code: K21.9 - GASTRO-ESOPHAGEAL REFLUX DISEASE WITHOUT ESOPHAGITIS   

Status: Chronic   Current Visit: Yes   


Qualifiers: 


   Esophagitis presence: without esophagitis   Qualified Code(s): K21.9 - 

Gastro-esophageal reflux disease without esophagitis   





(4) Multiple falls


SNOMED Code(s): 395469647


   ICD Code: R29.6 - REPEATED FALLS   Status: Acute   Current Visit: Yes   





(5) Weakness


SNOMED Code(s): 43547928


   ICD Code: R53.1 - WEAKNESS   Status: Acute   Current Visit: No   





(6) Depression


SNOMED Code(s): 66358903


   ICD Code: F32.9 - MAJOR DEPRESSIVE DISORDER, SINGLE EPISODE, UNSPECIFIED   

Status: Chronic   Current Visit: No   


Qualifiers: 


   Depression Type: major depressive disorder   Major depression recurrence: 

recurrent   Active/Remission status: currently active 





(7) Hypothyroid


SNOMED Code(s): 30640255


   ICD Code: E03.9 - HYPOTHYROIDISM, UNSPECIFIED   Status: Chronic   Current 

Visit: No   


Qualifiers: 


   Hypothyroidism type: acquired   Qualified Code(s): E03.9 - Hypothyroidism, 

unspecified   





(8) Obesity


SNOMED Code(s): 448726319, 861771143


   ICD Code: E66.9 - OBESITY, UNSPECIFIED   Status: Chronic   Current Visit: No 

  


Qualifiers: 


   Obesity type: due to excess calories   Obesity classification: unspecified 

obesity classification   Serious obesity comorbidity presence: without serious 

comorbidity   Qualified Code(s): E66.09 - Other obesity due to excess calories  

 





(9) Asymptomatic bacteriuria


SNOMED Code(s): 064568747


   ICD Code: R82.71 - BACTERIURIA   Status: Acute   Current Visit: Yes   


Problem List Initiated/Reviewed/Updated: Yes


Orders Last 24hrs: 


                               Active Orders 24 hr











 Category Date Time Status


 


 Admission Status [Patient Status] [ADT] Routine ADT  20 08:00 Active


 


 Ambulate [RC] ASDIRECTED Care  20 08:06 Active


 


 Oxygen Therapy [RC] PRN Care  20 08:06 Active


 


 Up With Assistance [RC] ASDIRECTED Care  20 08:06 Active


 


 VTE/DVT Education [RC] Per Unit Routine Care  20 08:06 Active


 


 Vital Signs [RC] Q8H Care  20 08:06 Active


 


 OT Evaluation and Treatment [CONS] Routine Cons  20 08:06 Active


 


 PT Evaluation and Treatment [CONS] Routine Cons  20 08:06 Active


 


 Regular Diet [DIET] Diet  20 Breakfast Active


 


 Shoulder 1V Rt [CR] Stat Exams  20 23:55 Taken


 


 Shoulder Comp Rt [CR] Stat Exams  20 22:44 Taken


 


 Acetaminophen/HYDROcodone [Norco 325-5 MG] Med  20 08:06 Active





 1 tab PO Q4H PRN   


 


 Carbidopa/Levodopa [Sinemet  mg] Med  20 07:15 Active





 3 tab PO Q3H   


 


 Enoxaparin [Lovenox] Med  20 08:15 Active





 40 mg SUBCUT Q24H   


 


 Ibuprofen [Motrin] Med  20 08:06 Active





 800 mg PO Q6H PRN   


 


 Levofloxacin/Dextrose 5%-Water [Levaquin in D5W 500 MG/ Med  20 09:00 

Ordered





 100 ML] 500 mg   





 Premix Bag 1 bag   





 IV Q24H   


 


 Sodium Chloride 0.9% [Saline Flush] Med  20 23:24 Active





 10 ml FLUSH ASDIRECTED PRN   


 


 Saline Lock Insert [OM.PC] Routine Oth  20 23:24 Ordered


 


 Resuscitation Status Routine Resus Stat  20 08:06 Ordered








                                Medication Orders





Hydrocodone Bitart/Acetaminophen (Norco 325-5 Mg)  1 tab PO Q4H PRN


   PRN Reason: Pain (moderate 4-6)


Carbidopa/Levodopa (Sinemet  Mg)  3 tab PO Q3H SHANI


   Last Admin: 20 07:10  Dose: 3 tab


   Documented by: JOSE


Enoxaparin Sodium (Lovenox)  40 mg SUBCUT Q24H SHANI


Levofloxacin/Dextrose 500 mg/ (Premix)  100 mls @ 100 mls/hr IV Q24H SHANI


Ibuprofen (Motrin)  800 mg PO Q6H PRN


   PRN Reason: Pain (mild 1-3)


Sodium Chloride (Saline Flush)  10 ml FLUSH ASDIRECTED PRN


   PRN Reason: Keep Vein Open








Assessment/Plan Comment:: 


Given associated social situation at home.She is not able to return home 

independently right away. We'll place a shoulder immobilizer, control pain with 

NSAIDs and narcotics as needed, and consult physical and occupational therapy. 

Will discuss with medical social worker about disposition after a 2-3 day 

inpatient stay

## 2020-07-10 NOTE — CR
INDICATION:  Dislocation/post-reduction view.



RIGHT SHOULDER:  Two AP views of the right shoulder were obtained 07/09/20 at 
1645 hours and compared with 2335 hours from the same day and now show the 
humerus to have an appearance compatible with normal position in the 
glenohumeral joint area.  Reduction of dislocation is strongly suggested.  A 
lateral or Y-view may be helpful for confirmation.



Widening of the AC joint space suggests a grade 1 AC joint strain additionally. 




Report was called to Dr. Luong at approximately 1723 hours.

[facility]